# Patient Record
Sex: FEMALE | Race: WHITE | NOT HISPANIC OR LATINO
[De-identification: names, ages, dates, MRNs, and addresses within clinical notes are randomized per-mention and may not be internally consistent; named-entity substitution may affect disease eponyms.]

---

## 2017-01-29 ENCOUNTER — RESULT REVIEW (OUTPATIENT)
Age: 47
End: 2017-01-29

## 2018-03-29 ENCOUNTER — APPOINTMENT (OUTPATIENT)
Dept: INTERNAL MEDICINE | Facility: CLINIC | Age: 48
End: 2018-03-29
Payer: COMMERCIAL

## 2018-03-29 VITALS
HEIGHT: 62.5 IN | SYSTOLIC BLOOD PRESSURE: 120 MMHG | DIASTOLIC BLOOD PRESSURE: 80 MMHG | TEMPERATURE: 97.6 F | WEIGHT: 171 LBS | BODY MASS INDEX: 30.68 KG/M2

## 2018-03-29 DIAGNOSIS — M85.89 OTHER SPECIFIED DISORDERS OF BONE DENSITY AND STRUCTURE, MULTIPLE SITES: ICD-10-CM

## 2018-03-29 DIAGNOSIS — R10.13 EPIGASTRIC PAIN: ICD-10-CM

## 2018-03-29 DIAGNOSIS — R14.0 ABDOMINAL DISTENSION (GASEOUS): ICD-10-CM

## 2018-03-29 DIAGNOSIS — Z91.89 OTHER SPECIFIED PERSONAL RISK FACTORS, NOT ELSEWHERE CLASSIFIED: ICD-10-CM

## 2018-03-29 DIAGNOSIS — R92.2 INCONCLUSIVE MAMMOGRAM: ICD-10-CM

## 2018-03-29 PROCEDURE — 94010 BREATHING CAPACITY TEST: CPT

## 2018-03-29 PROCEDURE — 93000 ELECTROCARDIOGRAM COMPLETE: CPT

## 2018-03-29 PROCEDURE — 99386 PREV VISIT NEW AGE 40-64: CPT | Mod: 25

## 2018-04-21 ENCOUNTER — APPOINTMENT (OUTPATIENT)
Dept: GASTROENTEROLOGY | Facility: CLINIC | Age: 48
End: 2018-04-21
Payer: COMMERCIAL

## 2018-04-21 ENCOUNTER — LABORATORY RESULT (OUTPATIENT)
Age: 48
End: 2018-04-21

## 2018-04-21 PROCEDURE — 45380 COLONOSCOPY AND BIOPSY: CPT

## 2018-04-21 PROCEDURE — 43239 EGD BIOPSY SINGLE/MULTIPLE: CPT

## 2018-04-25 ENCOUNTER — OTHER (OUTPATIENT)
Age: 48
End: 2018-04-25

## 2018-05-01 ENCOUNTER — MEDICATION RENEWAL (OUTPATIENT)
Age: 48
End: 2018-05-01

## 2018-05-01 ENCOUNTER — OTHER (OUTPATIENT)
Age: 48
End: 2018-05-01

## 2018-05-01 ENCOUNTER — MESSAGE (OUTPATIENT)
Age: 48
End: 2018-05-01

## 2018-06-22 ENCOUNTER — APPOINTMENT (OUTPATIENT)
Dept: INTERNAL MEDICINE | Facility: CLINIC | Age: 48
End: 2018-06-22
Payer: COMMERCIAL

## 2018-06-22 VITALS
WEIGHT: 216 LBS | DIASTOLIC BLOOD PRESSURE: 70 MMHG | SYSTOLIC BLOOD PRESSURE: 118 MMHG | BODY MASS INDEX: 39.75 KG/M2 | TEMPERATURE: 96.9 F | HEIGHT: 62 IN

## 2018-06-22 DIAGNOSIS — Z23 ENCOUNTER FOR IMMUNIZATION: ICD-10-CM

## 2018-06-22 DIAGNOSIS — Z02.1 ENCOUNTER FOR PRE-EMPLOYMENT EXAMINATION: ICD-10-CM

## 2018-06-22 PROCEDURE — 86580 TB INTRADERMAL TEST: CPT

## 2018-06-22 PROCEDURE — 99213 OFFICE O/P EST LOW 20 MIN: CPT | Mod: 25

## 2018-06-25 ENCOUNTER — APPOINTMENT (OUTPATIENT)
Dept: INTERNAL MEDICINE | Facility: CLINIC | Age: 48
End: 2018-06-25
Payer: COMMERCIAL

## 2018-06-25 VITALS — SYSTOLIC BLOOD PRESSURE: 120 MMHG | DIASTOLIC BLOOD PRESSURE: 80 MMHG

## 2018-06-25 DIAGNOSIS — Z01.00 ENCOUNTER FOR EXAMINATION OF EYES AND VISION W/OUT ABNORMAL FINDINGS: ICD-10-CM

## 2018-06-25 PROCEDURE — 99173 VISUAL ACUITY SCREEN: CPT | Mod: 59

## 2018-06-25 PROCEDURE — 99213 OFFICE O/P EST LOW 20 MIN: CPT | Mod: 25

## 2018-08-20 DIAGNOSIS — R73.09 OTHER ABNORMAL GLUCOSE: ICD-10-CM

## 2018-08-20 DIAGNOSIS — E55.9 VITAMIN D DEFICIENCY, UNSPECIFIED: ICD-10-CM

## 2018-08-20 DIAGNOSIS — Z01.84 ENCOUNTER FOR ANTIBODY RESPONSE EXAMINATION: ICD-10-CM

## 2019-05-02 PROBLEM — E55.9 VITAMIN D DEFICIENCY: Status: ACTIVE | Noted: 2019-05-02

## 2019-05-02 PROBLEM — R73.09 ABNORMAL GLUCOSE: Status: ACTIVE | Noted: 2019-05-02

## 2019-05-02 PROBLEM — Z01.84 IMMUNITY STATUS TESTING: Status: ACTIVE | Noted: 2019-05-02

## 2019-05-03 ENCOUNTER — LABORATORY RESULT (OUTPATIENT)
Age: 49
End: 2019-05-03

## 2019-05-06 LAB
25(OH)D3 SERPL-MCNC: 30.8 NG/ML
ALBUMIN SERPL ELPH-MCNC: 4.5 G/DL
ALP BLD-CCNC: 54 U/L
ALT SERPL-CCNC: 14 U/L
ANION GAP SERPL CALC-SCNC: 10 MMOL/L
APPEARANCE: CLEAR
AST SERPL-CCNC: 17 U/L
BASOPHILS # BLD AUTO: 0.05 K/UL
BASOPHILS NFR BLD AUTO: 0.8 %
BILIRUB SERPL-MCNC: 0.4 MG/DL
BILIRUBIN URINE: NEGATIVE
BLOOD URINE: NEGATIVE
BUN SERPL-MCNC: 13 MG/DL
CALCIUM SERPL-MCNC: 9.6 MG/DL
CHLORIDE SERPL-SCNC: 106 MMOL/L
CHOLEST SERPL-MCNC: 211 MG/DL
CHOLEST/HDLC SERPL: 3.6 RATIO
CO2 SERPL-SCNC: 28 MMOL/L
COLOR: NORMAL
CREAT SERPL-MCNC: 0.8 MG/DL
EOSINOPHIL # BLD AUTO: 0.1 K/UL
EOSINOPHIL NFR BLD AUTO: 1.6 %
ERYTHROCYTE [SEDIMENTATION RATE] IN BLOOD BY WESTERGREN METHOD: 3 MM/HR
ESTIMATED AVERAGE GLUCOSE: 111 MG/DL
GLUCOSE QUALITATIVE U: NEGATIVE
GLUCOSE SERPL-MCNC: 89 MG/DL
HBA1C MFR BLD HPLC: 5.5 %
HBV SURFACE AB SERPL IA-ACNC: <3 MIU/ML
HCT VFR BLD CALC: 43.3 %
HDLC SERPL-MCNC: 59 MG/DL
HGB BLD-MCNC: 13.9 G/DL
IMM GRANULOCYTES NFR BLD AUTO: 0.3 %
KETONES URINE: NEGATIVE
LDLC SERPL CALC-MCNC: 138 MG/DL
LDLC SERPL DIRECT ASSAY-MCNC: 149 MG/DL
LEUKOCYTE ESTERASE URINE: NEGATIVE
LYMPHOCYTES # BLD AUTO: 2 K/UL
LYMPHOCYTES NFR BLD AUTO: 32.9 %
MAN DIFF?: NORMAL
MCHC RBC-ENTMCNC: 28.9 PG
MCHC RBC-ENTMCNC: 32.1 GM/DL
MCV RBC AUTO: 90 FL
MEV IGG FLD QL IA: 296 AU/ML
MEV IGG+IGM SER-IMP: POSITIVE
MONOCYTES # BLD AUTO: 0.56 K/UL
MONOCYTES NFR BLD AUTO: 9.2 %
MUV AB SER-ACNC: NORMAL
MUV IGG SER QL IA: 10.5 AU/ML
NEUTROPHILS # BLD AUTO: 3.34 K/UL
NEUTROPHILS NFR BLD AUTO: 55.2 %
NITRITE URINE: NEGATIVE
PH URINE: 7.5
PLATELET # BLD AUTO: 204 K/UL
POTASSIUM SERPL-SCNC: 4.3 MMOL/L
PROT SERPL-MCNC: 6.6 G/DL
PROTEIN URINE: NEGATIVE
RBC # BLD: 4.81 M/UL
RBC # FLD: 12.5 %
RUBV IGG FLD-ACNC: 27.1 INDEX
RUBV IGG SER-IMP: POSITIVE
SAVE SPECIMEN: NORMAL
SODIUM SERPL-SCNC: 143 MMOL/L
SPECIFIC GRAVITY URINE: 1.02
T3 SERPL-MCNC: 104 NG/DL
T3FREE SERPL-MCNC: 2.99 PG/ML
T3RU NFR SERPL: 1 TBI
T4 FREE SERPL-MCNC: 1.1 NG/DL
T4 SERPL-MCNC: 6 UG/DL
TRIGL SERPL-MCNC: 69 MG/DL
TSH SERPL-ACNC: 2.02 UIU/ML
UROBILINOGEN URINE: NORMAL
VZV AB TITR SER: POSITIVE
VZV IGG SER IF-ACNC: 858.1 INDEX
WBC # FLD AUTO: 6.07 K/UL

## 2019-05-15 ENCOUNTER — APPOINTMENT (OUTPATIENT)
Dept: INTERNAL MEDICINE | Facility: CLINIC | Age: 49
End: 2019-05-15
Payer: COMMERCIAL

## 2019-05-15 VITALS
HEIGHT: 62 IN | BODY MASS INDEX: 27.97 KG/M2 | WEIGHT: 152 LBS | DIASTOLIC BLOOD PRESSURE: 70 MMHG | TEMPERATURE: 97.6 F | SYSTOLIC BLOOD PRESSURE: 100 MMHG

## 2019-05-15 DIAGNOSIS — Z23 ENCOUNTER FOR IMMUNIZATION: ICD-10-CM

## 2019-05-15 PROCEDURE — 94010 BREATHING CAPACITY TEST: CPT

## 2019-05-15 PROCEDURE — 90471 IMMUNIZATION ADMIN: CPT

## 2019-05-15 PROCEDURE — 93000 ELECTROCARDIOGRAM COMPLETE: CPT

## 2019-05-15 PROCEDURE — 86580 TB INTRADERMAL TEST: CPT

## 2019-05-15 PROCEDURE — 90707 MMR VACCINE SC: CPT

## 2019-05-15 PROCEDURE — 99396 PREV VISIT EST AGE 40-64: CPT | Mod: 25

## 2019-05-15 PROCEDURE — 90472 IMMUNIZATION ADMIN EACH ADD: CPT

## 2019-05-15 PROCEDURE — 90750 HZV VACC RECOMBINANT IM: CPT

## 2019-05-15 NOTE — PHYSICAL EXAM
[No Acute Distress] : no acute distress [Well Nourished] : well nourished [Well Developed] : well developed [Normal Sclera/Conjunctiva] : normal sclera/conjunctiva [Well-Appearing] : well-appearing [PERRL] : pupils equal round and reactive to light [EOMI] : extraocular movements intact [Normal Outer Ear/Nose] : the outer ears and nose were normal in appearance [Normal Oropharynx] : the oropharynx was normal [No JVD] : no jugular venous distention [Supple] : supple [Thyroid Normal, No Nodules] : the thyroid was normal and there were no nodules present [No Lymphadenopathy] : no lymphadenopathy [No Respiratory Distress] : no respiratory distress  [Clear to Auscultation] : lungs were clear to auscultation bilaterally [Normal Rate] : normal rate  [No Accessory Muscle Use] : no accessory muscle use [Regular Rhythm] : with a regular rhythm [No Murmur] : no murmur heard [Normal S1, S2] : normal S1 and S2 [No Carotid Bruits] : no carotid bruits [No Abdominal Bruit] : a ~M bruit was not heard ~T in the abdomen [No Varicosities] : no varicosities [Pedal Pulses Present] : the pedal pulses are present [No Edema] : there was no peripheral edema [No Extremity Clubbing/Cyanosis] : no extremity clubbing/cyanosis [No Palpable Aorta] : no palpable aorta [Non Tender] : non-tender [Non-distended] : non-distended [Soft] : abdomen soft [No HSM] : no HSM [Normal Bowel Sounds] : normal bowel sounds [Normal Posterior Cervical Nodes] : no posterior cervical lymphadenopathy [Normal Anterior Cervical Nodes] : no anterior cervical lymphadenopathy [No CVA Tenderness] : no CVA  tenderness [No Joint Swelling] : no joint swelling [No Spinal Tenderness] : no spinal tenderness [Grossly Normal Strength/Tone] : grossly normal strength/tone [No Rash] : no rash [Normal Gait] : normal gait [Coordination Grossly Intact] : coordination grossly intact [No Focal Deficits] : no focal deficits [Normal Insight/Judgement] : insight and judgment were intact [Normal Affect] : the affect was normal [Deep Tendon Reflexes (DTR)] : deep tendon reflexes were 2+ and symmetric [Normal Appearance] : normal in appearance [No Masses] : no palpable masses [No Axillary Lymphadenopathy] : no axillary lymphadenopathy [de-identified] : dense fibrocystic breasts

## 2019-05-15 NOTE — HISTORY OF PRESENT ILLNESS
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  Her mumps IGG level was equivocal-needs an MMR booster shot.\par \par She is also interested in the Shingrix vaccine.\par \par Patient has been in good overall health this past year.\par She has been working out with a  since last year.  Her weight is down significantly since last year. [FreeTextEntry1] : Patient presents today for CPE.\par

## 2019-05-15 NOTE — REVIEW OF SYSTEMS
[Negative] : Psychiatric [Recent Change In Weight] : ~T recent weight change [FreeTextEntry2] : lost more than 50 lbs since last year

## 2019-05-15 NOTE — HEALTH RISK ASSESSMENT
[Patient reported mammogram was normal] : Patient reported mammogram was normal [Patient reported PAP Smear was normal] : Patient reported PAP Smear was normal [Patient reported bone density results were abnormal] : Patient reported bone density results were abnormal [Patient reported colonoscopy was normal] : Patient reported colonoscopy was normal [Good] : ~his/her~  mood as  good [MammogramComments] : at [facility] [PapSmearDate] : 05/18 [MammogramDate] : 05/18 [BoneDensityDate] : 05/18 [PapSmearComments] : with Dr. Morales [BoneDensityComments] : T scores were -1.7 and -0.5 [ColonoscopyDate] : 04/18 [ColonoscopyComments] : with Dr. Quintanilla

## 2019-08-01 ENCOUNTER — APPOINTMENT (OUTPATIENT)
Dept: INTERNAL MEDICINE | Facility: CLINIC | Age: 49
End: 2019-08-01

## 2019-09-05 ENCOUNTER — APPOINTMENT (OUTPATIENT)
Dept: INTERNAL MEDICINE | Facility: CLINIC | Age: 49
End: 2019-09-05

## 2019-11-20 ENCOUNTER — RESULT REVIEW (OUTPATIENT)
Age: 49
End: 2019-11-20

## 2020-01-06 ENCOUNTER — EMERGENCY (EMERGENCY)
Facility: HOSPITAL | Age: 50
LOS: 1 days | Discharge: HOME | End: 2020-01-06
Attending: EMERGENCY MEDICINE | Admitting: EMERGENCY MEDICINE
Payer: COMMERCIAL

## 2020-01-06 ENCOUNTER — TRANSCRIPTION ENCOUNTER (OUTPATIENT)
Age: 50
End: 2020-01-06

## 2020-01-06 VITALS
HEART RATE: 63 BPM | OXYGEN SATURATION: 98 % | TEMPERATURE: 98 F | RESPIRATION RATE: 18 BRPM | DIASTOLIC BLOOD PRESSURE: 85 MMHG | WEIGHT: 160.06 LBS | SYSTOLIC BLOOD PRESSURE: 148 MMHG

## 2020-01-06 LAB
ALBUMIN SERPL ELPH-MCNC: 3.9 G/DL — SIGNIFICANT CHANGE UP (ref 3.3–5)
ALP SERPL-CCNC: 58 U/L — SIGNIFICANT CHANGE UP (ref 40–120)
ALT FLD-CCNC: 28 U/L — SIGNIFICANT CHANGE UP (ref 12–78)
ANION GAP SERPL CALC-SCNC: 7 MMOL/L — SIGNIFICANT CHANGE UP (ref 5–17)
AST SERPL-CCNC: 18 U/L — SIGNIFICANT CHANGE UP (ref 15–37)
BASOPHILS # BLD AUTO: 0.06 K/UL — SIGNIFICANT CHANGE UP (ref 0–0.2)
BASOPHILS NFR BLD AUTO: 0.8 % — SIGNIFICANT CHANGE UP (ref 0–2)
BILIRUB SERPL-MCNC: 0.3 MG/DL — SIGNIFICANT CHANGE UP (ref 0.2–1.2)
BUN SERPL-MCNC: 17 MG/DL — SIGNIFICANT CHANGE UP (ref 7–23)
CALCIUM SERPL-MCNC: 9 MG/DL — SIGNIFICANT CHANGE UP (ref 8.5–10.1)
CHLORIDE SERPL-SCNC: 109 MMOL/L — HIGH (ref 96–108)
CO2 SERPL-SCNC: 25 MMOL/L — SIGNIFICANT CHANGE UP (ref 22–31)
CREAT SERPL-MCNC: 0.74 MG/DL — SIGNIFICANT CHANGE UP (ref 0.5–1.3)
EOSINOPHIL # BLD AUTO: 0.11 K/UL — SIGNIFICANT CHANGE UP (ref 0–0.5)
EOSINOPHIL NFR BLD AUTO: 1.4 % — SIGNIFICANT CHANGE UP (ref 0–6)
GLUCOSE SERPL-MCNC: 99 MG/DL — SIGNIFICANT CHANGE UP (ref 70–99)
HCG SERPL-ACNC: <1 MIU/ML — SIGNIFICANT CHANGE UP
HCT VFR BLD CALC: 37.7 % — SIGNIFICANT CHANGE UP (ref 34.5–45)
HGB BLD-MCNC: 13.2 G/DL — SIGNIFICANT CHANGE UP (ref 11.5–15.5)
IMM GRANULOCYTES NFR BLD AUTO: 0.1 % — SIGNIFICANT CHANGE UP (ref 0–1.5)
LYMPHOCYTES # BLD AUTO: 2.26 K/UL — SIGNIFICANT CHANGE UP (ref 1–3.3)
LYMPHOCYTES # BLD AUTO: 28.6 % — SIGNIFICANT CHANGE UP (ref 13–44)
MCHC RBC-ENTMCNC: 29.3 PG — SIGNIFICANT CHANGE UP (ref 27–34)
MCHC RBC-ENTMCNC: 35 GM/DL — SIGNIFICANT CHANGE UP (ref 32–36)
MCV RBC AUTO: 83.8 FL — SIGNIFICANT CHANGE UP (ref 80–100)
MONOCYTES # BLD AUTO: 0.66 K/UL — SIGNIFICANT CHANGE UP (ref 0–0.9)
MONOCYTES NFR BLD AUTO: 8.4 % — SIGNIFICANT CHANGE UP (ref 2–14)
NEUTROPHILS # BLD AUTO: 4.79 K/UL — SIGNIFICANT CHANGE UP (ref 1.8–7.4)
NEUTROPHILS NFR BLD AUTO: 60.7 % — SIGNIFICANT CHANGE UP (ref 43–77)
NRBC # BLD: 0 /100 WBCS — SIGNIFICANT CHANGE UP (ref 0–0)
PLATELET # BLD AUTO: 217 K/UL — SIGNIFICANT CHANGE UP (ref 150–400)
POTASSIUM SERPL-MCNC: 4 MMOL/L — SIGNIFICANT CHANGE UP (ref 3.5–5.3)
POTASSIUM SERPL-SCNC: 4 MMOL/L — SIGNIFICANT CHANGE UP (ref 3.5–5.3)
PROT SERPL-MCNC: 7.1 G/DL — SIGNIFICANT CHANGE UP (ref 6–8.3)
RBC # BLD: 4.5 M/UL — SIGNIFICANT CHANGE UP (ref 3.8–5.2)
RBC # FLD: 12.6 % — SIGNIFICANT CHANGE UP (ref 10.3–14.5)
SODIUM SERPL-SCNC: 141 MMOL/L — SIGNIFICANT CHANGE UP (ref 135–145)
WBC # BLD: 7.89 K/UL — SIGNIFICANT CHANGE UP (ref 3.8–10.5)
WBC # FLD AUTO: 7.89 K/UL — SIGNIFICANT CHANGE UP (ref 3.8–10.5)

## 2020-01-06 PROCEDURE — 93010 ELECTROCARDIOGRAM REPORT: CPT

## 2020-01-06 PROCEDURE — 76705 ECHO EXAM OF ABDOMEN: CPT | Mod: 26

## 2020-01-06 PROCEDURE — 83690 ASSAY OF LIPASE: CPT

## 2020-01-06 PROCEDURE — 99284 EMERGENCY DEPT VISIT MOD MDM: CPT

## 2020-01-06 PROCEDURE — 96374 THER/PROPH/DIAG INJ IV PUSH: CPT

## 2020-01-06 PROCEDURE — 80053 COMPREHEN METABOLIC PANEL: CPT

## 2020-01-06 PROCEDURE — 93005 ELECTROCARDIOGRAM TRACING: CPT

## 2020-01-06 PROCEDURE — 36415 COLL VENOUS BLD VENIPUNCTURE: CPT

## 2020-01-06 PROCEDURE — 85027 COMPLETE CBC AUTOMATED: CPT

## 2020-01-06 PROCEDURE — 84702 CHORIONIC GONADOTROPIN TEST: CPT

## 2020-01-06 PROCEDURE — 99284 EMERGENCY DEPT VISIT MOD MDM: CPT | Mod: 25

## 2020-01-06 PROCEDURE — 96375 TX/PRO/DX INJ NEW DRUG ADDON: CPT

## 2020-01-06 PROCEDURE — 84484 ASSAY OF TROPONIN QUANT: CPT

## 2020-01-06 PROCEDURE — 76705 ECHO EXAM OF ABDOMEN: CPT

## 2020-01-06 RX ORDER — SUCRALFATE 1 G
1 TABLET ORAL
Qty: 28 | Refills: 0
Start: 2020-01-06 | End: 2020-01-12

## 2020-01-06 RX ORDER — OXYCODONE AND ACETAMINOPHEN 5; 325 MG/1; MG/1
1 TABLET ORAL ONCE
Refills: 0 | Status: DISCONTINUED | OUTPATIENT
Start: 2020-01-06 | End: 2020-01-06

## 2020-01-06 RX ORDER — KETOROLAC TROMETHAMINE 30 MG/ML
15 SYRINGE (ML) INJECTION ONCE
Refills: 0 | Status: DISCONTINUED | OUTPATIENT
Start: 2020-01-06 | End: 2020-01-06

## 2020-01-06 RX ORDER — SODIUM CHLORIDE 9 MG/ML
1000 INJECTION INTRAMUSCULAR; INTRAVENOUS; SUBCUTANEOUS ONCE
Refills: 0 | Status: DISCONTINUED | OUTPATIENT
Start: 2020-01-06 | End: 2020-01-06

## 2020-01-06 RX ORDER — SUCRALFATE 1 G
1 TABLET ORAL ONCE
Refills: 0 | Status: COMPLETED | OUTPATIENT
Start: 2020-01-06 | End: 2020-01-06

## 2020-01-06 RX ORDER — PANTOPRAZOLE SODIUM 20 MG/1
40 TABLET, DELAYED RELEASE ORAL ONCE
Refills: 0 | Status: COMPLETED | OUTPATIENT
Start: 2020-01-06 | End: 2020-01-06

## 2020-01-06 RX ORDER — IOHEXOL 300 MG/ML
30 INJECTION, SOLUTION INTRAVENOUS ONCE
Refills: 0 | Status: DISCONTINUED | OUTPATIENT
Start: 2020-01-06 | End: 2020-01-06

## 2020-01-06 RX ORDER — FAMOTIDINE 10 MG/ML
20 INJECTION INTRAVENOUS ONCE
Refills: 0 | Status: COMPLETED | OUTPATIENT
Start: 2020-01-06 | End: 2020-01-06

## 2020-01-06 RX ORDER — PANTOPRAZOLE SODIUM 20 MG/1
1 TABLET, DELAYED RELEASE ORAL
Qty: 30 | Refills: 0
Start: 2020-01-06 | End: 2020-02-04

## 2020-01-06 RX ORDER — DIPHENHYDRAMINE HYDROCHLORIDE AND LIDOCAINE HYDROCHLORIDE AND ALUMINUM HYDROXIDE AND MAGNESIUM HYDRO
30 KIT ONCE
Refills: 0 | Status: COMPLETED | OUTPATIENT
Start: 2020-01-06 | End: 2020-01-06

## 2020-01-06 RX ORDER — SODIUM CHLORIDE 9 MG/ML
1000 INJECTION INTRAMUSCULAR; INTRAVENOUS; SUBCUTANEOUS ONCE
Refills: 0 | Status: COMPLETED | OUTPATIENT
Start: 2020-01-06 | End: 2020-01-06

## 2020-01-06 RX ORDER — ONDANSETRON 8 MG/1
4 TABLET, FILM COATED ORAL ONCE
Refills: 0 | Status: COMPLETED | OUTPATIENT
Start: 2020-01-06 | End: 2020-01-06

## 2020-01-06 RX ADMIN — SODIUM CHLORIDE 1000 MILLILITER(S): 9 INJECTION INTRAMUSCULAR; INTRAVENOUS; SUBCUTANEOUS at 20:03

## 2020-01-06 RX ADMIN — DIPHENHYDRAMINE HYDROCHLORIDE AND LIDOCAINE HYDROCHLORIDE AND ALUMINUM HYDROXIDE AND MAGNESIUM HYDRO 30 MILLILITER(S): KIT at 19:50

## 2020-01-06 RX ADMIN — PANTOPRAZOLE SODIUM 40 MILLIGRAM(S): 20 TABLET, DELAYED RELEASE ORAL at 19:49

## 2020-01-06 RX ADMIN — Medication 15 MILLIGRAM(S): at 19:49

## 2020-01-06 RX ADMIN — OXYCODONE AND ACETAMINOPHEN 1 TABLET(S): 5; 325 TABLET ORAL at 20:54

## 2020-01-06 RX ADMIN — FAMOTIDINE 20 MILLIGRAM(S): 10 INJECTION INTRAVENOUS at 19:49

## 2020-01-06 RX ADMIN — ONDANSETRON 4 MILLIGRAM(S): 8 TABLET, FILM COATED ORAL at 19:49

## 2020-01-06 RX ADMIN — Medication 1 GRAM(S): at 20:54

## 2020-01-06 NOTE — ED ADULT NURSE NOTE - OBJECTIVE STATEMENT
Present to Er with c/o of abdominal pain. Pt was sent from Urgent care to rule out pancreatitis. Denies any chest pain or shortness of breath.

## 2020-01-06 NOTE — ED PROVIDER NOTE - RESPIRATORY, MLM
How Severe Are Your Bumps?: mild Have Your Bumps Been Treated?: not been treated Is This A New Presentation, Or A Follow-Up?: Bump Breath sounds clear and equal bilaterally.

## 2020-01-06 NOTE — ED PROVIDER NOTE - NSFOLLOWUPINSTRUCTIONS_ED_ALL_ED_FT
Gastritis, Adult  Gastritis is inflammation of the stomach. There are two kinds of gastritis:  Acute gastritis. This kind develops suddenly.Chronic gastritis. This kind is much more common and lasts for a long time.Gastritis happens when the lining of the stomach becomes weak or gets damaged. Without treatment, gastritis can lead to stomach bleeding and ulcers.  What are the causes?  This condition may be caused by:  An infection.Drinking too much alcohol.Certain medicines. These include steroids, antibiotics, and some over-the-counter medicines, such as aspirin or ibuprofen.Having too much acid in the stomach.A disease of the intestines or stomach.Stress.An allergic reaction.Crohn's disease.Some cancer treatments (radiation).Sometimes the cause of this condition is not known.  What are the signs or symptoms?  Symptoms of this condition include:  Pain or a burning sensation in the upper abdomen.Nausea.Vomiting.An uncomfortable feeling of fullness after eating.Weight loss.Bad breath.Blood in your vomit or stools.In some cases, there are no symptoms.  How is this diagnosed?  This condition may be diagnosed with:  Your medical history and a description of your symptoms.A physical exam.Tests. These can include:  Blood tests.Stool tests.A test in which a thin, flexible instrument with a light and a camera is passed down the esophagus and into the stomach (upper endoscopy).A test in which a sample of tissue is taken for testing (biopsy).How is this treated?  This condition may be treated with medicines. The medicines that are used vary depending on the cause of the gastritis:  If the condition is caused by a bacterial infection, you may be given antibiotic medicines.If the condition is caused by too much acid in the stomach, you may be given medicines called H2 blockers, proton pump inhibitors, or antacids.Treatment may also involve stopping the use of certain medicines, such as aspirin, ibuprofen, or other NSAIDs.  Follow these instructions at home:  Medicines     Take over-the-counter and prescription medicines only as told by your health care provider.If you were prescribed an antibiotic medicine, take it as told by your health care provider. Do not stop taking the antibiotic even if you start to feel better.Eating and drinking        Eat small, frequent meals instead of large meals.Avoid foods and drinks that make your symptoms worse.Drink enough fluid to keep your urine pale yellow.Alcohol use     Do not drink alcohol if:  Your health care provider tells you not to drink.You are pregnant, may be pregnant, or are planning to become pregnant.If you drink alcohol:  Limit your use to:  0–1 drink a day for women.0–2 drinks a day for men.Be aware of how much alcohol is in your drink. In the [place] one drink equals one 12 oz bottle of beer (355 mL), one 5 oz glass of wine (148 mL), or one 1½ oz glass of hard liquor (44 mL).General instructions     Talk with your health care provider about ways to manage stress, such as getting regular exercise or practicing deep breathing, meditation, or yoga.Do not use any products that contain nicotine or tobacco, such as cigarettes and e-cigarettes. If you need help quitting, ask your health care provider.Keep all follow-up visits as told by your health care provider. This is important.Contact a health care provider if:  Your symptoms get worse.Your symptoms return after treatment.Get help right away if:  You vomit blood or material that looks like coffee grounds.You have black or dark red stools.You are unable to keep fluids down.Your abdominal pain gets worse.You have a fever.You do not feel better after one week.Summary  Gastritis is inflammation of the lining of the stomach that can occur suddenly (acute) or develop slowly over time (chronic).This condition is diagnosed with a medical history, a physical exam, or tests.This condition may be treated with medicines to treat infection or medicines to reduce the amount of acid in your stomach.Follow your health care provider's instructions about taking medicines, making changes to your diet, and knowing when to call for help.This information is not intended to replace advice given to you by your health care provider. Make sure you discuss any questions you have with your health care provider.    Document Released: 12/12/2002 Document Revised: 05/07/2019 Document Reviewed: 05/07/2019  [email or web address] Interactive Patient Education © 2019 [employer]

## 2020-01-06 NOTE — ED PROVIDER NOTE - CPE EDP EYES NORM
Spiritual Plan of Care    Pt Name: Gokul Ribeiro  Pt : 1960  Date: 2018    Referral source: Trigger    Reason for visit: Emotional Support, Spiritual/Evangelical/Ritual Support    Visited with: Patient    Patient Assessment: Unable to assess    Trigger. Pt sleeping. A  will attempt to visit pt another time.   normal...

## 2020-01-06 NOTE — ED PROVIDER NOTE - PATIENT PORTAL LINK FT
You can access the FollowMyHealth Patient Portal offered by [facility] by registering at the following website: [email or web address]. By joining [employer]’s FollowMyHealth portal, you will also be able to view your health information using other applications (apps) compatible with our system.

## 2020-01-06 NOTE — ED PROVIDER NOTE - NS ED ATTENDING STATEMENT MOD
No I have personally performed a face to face diagnostic evaluation on this patient. I have reviewed the ACP note and agree with the history, exam and plan of care, except as noted. Attending Only

## 2020-01-06 NOTE — ED PROVIDER NOTE - ATTENDING CONTRIBUTION TO CARE
I have personally performed a face to face diagnostic evaluation on this patient.  I have reviewed the PA note and agree with the history, exam, and plan of care, except as noted.  History and Exam by me shows patient sent to ER from urgent care for evalaution of epigastric pain, patient states earlier today she took motrin for muscleskeltal pain, later in afternoon felt spasming of epigastric area, radiating to sides, no fever, no vomiting, patient took pepcid and maalox with no releif, patient alert and oriented, heart and lungs clear, abdomen soft, epigastric discomfort with palpation, f/u labs, US abdomen, pain control, re-eval.

## 2020-01-06 NOTE — ED PROVIDER NOTE - CARE PROVIDER_API CALL
Mansi Quintanilla)  Gastroenterology  [address] [place] [address], [address]  [place] [address]  Phone: [phone]  Fax: [phone]  Follow Up Time:

## 2020-01-08 ENCOUNTER — APPOINTMENT (OUTPATIENT)
Dept: GASTROENTEROLOGY | Facility: CLINIC | Age: 50
End: 2020-01-08
Payer: COMMERCIAL

## 2020-01-08 VITALS
WEIGHT: 152 LBS | BODY MASS INDEX: 27.97 KG/M2 | TEMPERATURE: 97.9 F | SYSTOLIC BLOOD PRESSURE: 122 MMHG | HEIGHT: 62 IN | DIASTOLIC BLOOD PRESSURE: 90 MMHG

## 2020-01-08 DIAGNOSIS — R11.0 NAUSEA: ICD-10-CM

## 2020-01-08 DIAGNOSIS — R10.9 UNSPECIFIED ABDOMINAL PAIN: ICD-10-CM

## 2020-01-08 PROCEDURE — 99214 OFFICE O/P EST MOD 30 MIN: CPT

## 2020-01-08 NOTE — HISTORY OF PRESENT ILLNESS
[FreeTextEntry1] : 50 yo female with c/o epigastric pain 2 days ago, too pepcid started in Am.  Patient tried mylanta but had squeezing  abdominal pain. normal us. normal  bms. patient has been eating bland food.has had GERD..\par some nausea, no vomiting. normal bms with stool softner. patient reports abdominal pain 3/10 pain scale.

## 2020-01-08 NOTE — REVIEW OF SYSTEMS
[As Noted in HPI] : as noted in HPI [Fever] : no fever [Chills] : no chills [Chest Pain] : no chest pain [Earache] : no earache [Red Eyes] : eyes not red [SOB on Exertion] : no shortness of breath during exertion [Cough] : no cough [Arthralgias] : no arthralgias [Skin Lesions] : no skin lesions [Joint Pain] : no joint pain [Dizziness] : no dizziness [Anxiety] : no anxiety [Depression] : no depression [Easy Bleeding] : no tendency for easy bleeding [Muscle Weakness] : no muscle weakness [Easy Bruising] : no tendency for easy bruising

## 2020-01-08 NOTE — ASSESSMENT
[FreeTextEntry1] : Severe epigastric pain requring er evaluation, record  reviewed normal abdominal us, normal labs. Probable severe gerd.  s/p egd/colonoscopy in 2018 .\par \par plan ppi daily in am\par          h2 blocker qhs\par          carafate as needed.\par           bland diet\par           zofran prn\par          recommend continue ppi for at least 3 months and then to taper off slowly

## 2020-01-08 NOTE — PHYSICAL EXAM
[General Appearance - Alert] : alert [General Appearance - In No Acute Distress] : in no acute distress [General Appearance - Well Nourished] : well nourished [Sclera] : the sclera and conjunctiva were normal [Auscultation Breath Sounds / Voice Sounds] : lungs were clear to auscultation bilaterally [Respiration, Rhythm And Depth] : normal respiratory rhythm and effort [No CVA Tenderness] : no ~M costovertebral angle tenderness [Heart Sounds] : normal S1 and S2 [Nail Clubbing] : no clubbing  or cyanosis of the fingernails [Oriented To Time, Place, And Person] : oriented to person, place, and time [Heart Rate And Rhythm] : heart rate was normal and rhythm regular [] : no respiratory distress [Abdomen Soft] : soft [Bowel Sounds] : normal bowel sounds [Abdomen Tenderness] : non-tender [Abnormal Walk] : normal gait [Skin Color & Pigmentation] : normal skin color and pigmentation [Skin Lesions] : no skin lesions [No Focal Deficits] : no focal deficits

## 2020-10-21 ENCOUNTER — APPOINTMENT (OUTPATIENT)
Dept: INTERNAL MEDICINE | Facility: CLINIC | Age: 50
End: 2020-10-21
Payer: COMMERCIAL

## 2020-10-21 VITALS
WEIGHT: 160 LBS | SYSTOLIC BLOOD PRESSURE: 122 MMHG | TEMPERATURE: 98.2 F | HEART RATE: 73 BPM | HEIGHT: 62 IN | DIASTOLIC BLOOD PRESSURE: 60 MMHG | OXYGEN SATURATION: 98 % | BODY MASS INDEX: 29.44 KG/M2

## 2020-10-21 DIAGNOSIS — Z11.1 ENCOUNTER FOR SCREENING FOR RESPIRATORY TUBERCULOSIS: ICD-10-CM

## 2020-10-21 PROCEDURE — 99072 ADDL SUPL MATRL&STAF TM PHE: CPT

## 2020-10-21 PROCEDURE — 86580 TB INTRADERMAL TEST: CPT

## 2020-10-21 PROCEDURE — 99212 OFFICE O/P EST SF 10 MIN: CPT | Mod: 25

## 2020-10-22 ENCOUNTER — MED ADMIN CHARGE (OUTPATIENT)
Age: 50
End: 2020-10-22

## 2020-11-11 ENCOUNTER — APPOINTMENT (OUTPATIENT)
Dept: ORTHOPEDIC SURGERY | Facility: CLINIC | Age: 50
End: 2020-11-11
Payer: COMMERCIAL

## 2020-11-11 VITALS — HEIGHT: 62 IN | WEIGHT: 160 LBS | BODY MASS INDEX: 29.44 KG/M2

## 2020-11-11 DIAGNOSIS — S76.302A UNSPECIFIED INJURY OF MUSCLE, FASCIA AND TENDON OF THE POSTERIOR MUSCLE GROUP AT THIGH LEVEL, LEFT THIGH, INITIAL ENCOUNTER: ICD-10-CM

## 2020-11-11 DIAGNOSIS — M75.41 IMPINGEMENT SYNDROME OF RIGHT SHOULDER: ICD-10-CM

## 2020-11-11 PROCEDURE — 72170 X-RAY EXAM OF PELVIS: CPT

## 2020-11-11 PROCEDURE — 73030 X-RAY EXAM OF SHOULDER: CPT | Mod: RT

## 2020-11-11 PROCEDURE — 99072 ADDL SUPL MATRL&STAF TM PHE: CPT

## 2020-11-11 PROCEDURE — 99203 OFFICE O/P NEW LOW 30 MIN: CPT | Mod: 25

## 2020-11-11 PROCEDURE — 20610 DRAIN/INJ JOINT/BURSA W/O US: CPT | Mod: RT

## 2020-11-11 NOTE — PHYSICAL EXAM
[de-identified] : General Exam\par \par Well developed, well nourished\par No apparent distress\par Oriented to person, place, and time\par Mood: Normal\par Affect: Normal\par Balance and coordination: Normal\par Gait: Normal\par \par Left hip exam\par \par Skin: Clean/dry and intact\par Inspection: No obvious deformity, no swelling, no ecchymosis.\par Tenderness: no tenderness over greater trochanter/glut medius insertion. No tenderness pubic symphysis, pubic tubercle, hip flexors. + ttp ischial tuberosity and buttock. No ttp over the ASIS/Illiac crest.\par ROM: 0-120°. Internal rotation 30 external rotation 70\par Painful ROM: None\par hamstring + ttp biceps femoris.  + pauin w resisted hip ext\par Additional tests: No pain with circumduction negative impingement test at 90° mildly positive impingement test at 60° negative Jake negative [facility]\par Strength: 5/5 hip flexion/ADD/ABD/Q/H/TA/GS/EHL\par Neuro: Sensation in tact to light touch throughout in dp/sp/tib/elzbieta/saph distributions\par Pulses: 2+ DP/PT pulses\par \par Right shoulder exam\par \par Inspection: No swelling, ecchymosis or gross deformity.\par Skin: No masses, No lesions\par Neck: Negative Spurlings, full ROM without pain\par Tenderness: + bicipital tenderness, no tenderness to the greater tuberosity/RTC insertion, no anterior shoulder/lesser tuberosity tenderness. No tenderness SC joint, clavicle, AC joint.\par ROM: 160/60/T6\par Impingement tests: pos sotelo\par AC Joint: no pain with cross arm testing\par Biceps: ildly pos speed\par Strength: 5/5 abduction, external rotation, and internal rotation\par Neuro: AIN, PIN, Ulnar nerve motor intact\par Sensation: Intact to light touch in radial, median, ulnar, and axillary nerve distributions\par Vasc: 2+ radial pulse\par \par \par  [de-identified] : \par The following radiographs were ordered and read by me during this patients visit. I reviewed each radiograph in detail with the patient and discussed the findings as highlighted below. \par \par 3 views right shoulder were obtained today that show no fracture, dislocation. There is no degenerative change seen. There is no malalignment. No obvious osseous abnormality. Otherwise unremarkable.\par \par AP pelvis radiograph was obtained today. They're small calcific density at the lateral acetabulum bilaterally. Joint spaces are well maintained. Otherwise unremarkable\par \par

## 2020-11-11 NOTE — DISCUSSION/SUMMARY
[de-identified] : Right shoulder impingement and biceps tendinitis. Left hip hamstring tendinitis. She is a pain for greater than 8 months. We'll obtain an MRI of the left hip to further evaluate her hamstring. In terms of her shoulder she is biceps tendinitis and impingement\par \par Injection: Right shoulder (steps sheath\par Indication: Biceps tendinitis\par A discussion was had with the patient regarding this procedure and all questions were answered. All risks, benefits and alternatives were discussed. These included but were not limited to bleeding, infection, and allergic reaction. Alcohol was used to clean the skin, and betadine was used to sterilize and prep the area in the anterior  aspect of the right shoulder. Ethyl chloride spray was then used as a topical anesthetic. A 21-gauge needle was used to inject 4cc of 1% lidocaine and 1cc of 40mg/ml methylprednisolone into the right biceps sheath. A sterile bandage was then applied. The patient tolerated the procedure well and there were no complications. \par \par All questions answered follow up as needed

## 2020-11-11 NOTE — HISTORY OF PRESENT ILLNESS
[de-identified] : 50-year-old female with chief complaint of left hip and right shoulder pain. She reports pain for greater than 7 months. She increased her exercise during that had been as noted increasing pain in both regions. In terms of her head she has pain with sitting squats and lunges pain is located in the buttock posteriorly she feels it has been worsening. In terms of her shoulder she reports pain in the anterior shoulder that radiates into the biceps muscle she has pain with overhead activity she denies numbness and tingling\par \par The patient's past medical history, past surgical history, medications, allergies, and social history were reviewed by me today with the patient and documented accordingly. In addition, the patient's family history, which is noncontributory to this visit, was also reviewed.\par

## 2020-12-18 ENCOUNTER — RESULT REVIEW (OUTPATIENT)
Age: 50
End: 2020-12-18

## 2020-12-18 ENCOUNTER — APPOINTMENT (OUTPATIENT)
Dept: ULTRASOUND IMAGING | Facility: CLINIC | Age: 50
End: 2020-12-18
Payer: COMMERCIAL

## 2020-12-18 ENCOUNTER — OUTPATIENT (OUTPATIENT)
Dept: OUTPATIENT SERVICES | Facility: HOSPITAL | Age: 50
LOS: 1 days | End: 2020-12-18
Payer: COMMERCIAL

## 2020-12-18 DIAGNOSIS — S76.302A UNSPECIFIED INJURY OF MUSCLE, FASCIA AND TENDON OF THE POSTERIOR MUSCLE GROUP AT THIGH LEVEL, LEFT THIGH, INITIAL ENCOUNTER: ICD-10-CM

## 2020-12-18 PROCEDURE — 20611 DRAIN/INJ JOINT/BURSA W/US: CPT

## 2020-12-18 PROCEDURE — 20611 DRAIN/INJ JOINT/BURSA W/US: CPT | Mod: LT

## 2020-12-22 ENCOUNTER — RX RENEWAL (OUTPATIENT)
Age: 50
End: 2020-12-22

## 2020-12-23 PROBLEM — Z11.1 ENCOUNTER FOR PPD SKIN TEST READING: Status: RESOLVED | Noted: 2018-06-25 | Resolved: 2020-12-23

## 2020-12-28 ENCOUNTER — TRANSCRIPTION ENCOUNTER (OUTPATIENT)
Age: 50
End: 2020-12-28

## 2020-12-28 RX ORDER — ALBUTEROL SULFATE 90 UG/1
108 (90 BASE) INHALANT RESPIRATORY (INHALATION) EVERY 4 HOURS
Qty: 1 | Refills: 6 | Status: ACTIVE | COMMUNITY
Start: 2020-12-28 | End: 1900-01-01

## 2020-12-29 ENCOUNTER — TRANSCRIPTION ENCOUNTER (OUTPATIENT)
Age: 50
End: 2020-12-29

## 2020-12-30 ENCOUNTER — TRANSCRIPTION ENCOUNTER (OUTPATIENT)
Age: 50
End: 2020-12-30

## 2020-12-30 ENCOUNTER — APPOINTMENT (OUTPATIENT)
Dept: GASTROENTEROLOGY | Facility: CLINIC | Age: 50
End: 2020-12-30
Payer: COMMERCIAL

## 2020-12-30 DIAGNOSIS — R07.89 OTHER CHEST PAIN: ICD-10-CM

## 2020-12-30 PROCEDURE — 99214 OFFICE O/P EST MOD 30 MIN: CPT | Mod: 95

## 2020-12-30 NOTE — HISTORY OF PRESENT ILLNESS
[FreeTextEntry1] : 51 yo female with h/o gerd, esophageal spasms and abdominal pain, s/p egd/colonoscopy in 2018, pt currenty at home with covid, loss of taste , congestion, and generalized weaknes.. no fever. \par Patient with c/o sternal pain , started before she got covid, hurts mostly at night and in morning. Patient was also unable to do abdominal exercises due to pain. Pain hurts when she touches that area.\par patient reports  occassional gerd, takes antacid as needed, but does not seem to help this pain.\par no sob\par \par normal bms, no brbpr, no melena. no weight loss.

## 2020-12-30 NOTE — REVIEW OF SYSTEMS
[Fever] : no fever [Chills] : no chills [Feeling Poorly] : feeling poorly [Feeling Tired] : feeling tired [Loss Of Hearing] : no hearing loss [Chest Pain] : no chest pain [As Noted in HPI] : as noted in HPI [Dysuria] : no dysuria [Arthralgias] : no arthralgias [Skin Lesions] : no skin lesions [Anxiety] : no anxiety [Muscle Weakness] : muscle weakness [Easy Bleeding] : no tendency for easy bleeding [Easy Bruising] : no tendency for easy bruising [FreeTextEntry4] : loss of taste

## 2020-12-30 NOTE — PHYSICAL EXAM
[Sclera] : the sclera and conjunctiva were normal [Hearing Threshold Finger Rub Not [place]] : hearing was normal [Auscultation Breath Sounds / Voice Sounds] : lungs were clear to auscultation bilaterally [Bowel Sounds] : normal bowel sounds [Abdomen Soft] : soft [Abdomen Tenderness] : non-tender [Abnormal Walk] : normal gait

## 2020-12-30 NOTE — ASSESSMENT
[FreeTextEntry1] : sternal chest pain, probable  musculoskeletal pain\par \par plan trial of nsaid at night before  bed\par         monitor clinically\par        pt has appt with  in January

## 2020-12-30 NOTE — REASON FOR VISIT
[Home] : at home, [unfilled] , at the time of the visit. [Medical Office: ([facility])___] : at the medical office located in  [Verbal consent obtained from patient] : the patient, [unfilled]

## 2021-01-06 ENCOUNTER — OUTPATIENT (OUTPATIENT)
Dept: OUTPATIENT SERVICES | Facility: HOSPITAL | Age: 51
LOS: 1 days | End: 2021-01-06
Payer: COMMERCIAL

## 2021-01-06 ENCOUNTER — APPOINTMENT (OUTPATIENT)
Dept: RADIOLOGY | Facility: CLINIC | Age: 51
End: 2021-01-06
Payer: COMMERCIAL

## 2021-01-06 ENCOUNTER — LABORATORY RESULT (OUTPATIENT)
Age: 51
End: 2021-01-06

## 2021-01-06 ENCOUNTER — APPOINTMENT (OUTPATIENT)
Dept: INTERNAL MEDICINE | Facility: CLINIC | Age: 51
End: 2021-01-06
Payer: COMMERCIAL

## 2021-01-06 VITALS
SYSTOLIC BLOOD PRESSURE: 132 MMHG | DIASTOLIC BLOOD PRESSURE: 100 MMHG | BODY MASS INDEX: 27.23 KG/M2 | HEIGHT: 62 IN | WEIGHT: 148 LBS | TEMPERATURE: 97.8 F

## 2021-01-06 DIAGNOSIS — R00.2 PALPITATIONS: ICD-10-CM

## 2021-01-06 DIAGNOSIS — R07.89 OTHER CHEST PAIN: ICD-10-CM

## 2021-01-06 DIAGNOSIS — Z00.8 ENCOUNTER FOR OTHER GENERAL EXAMINATION: ICD-10-CM

## 2021-01-06 DIAGNOSIS — Z11.59 ENCOUNTER FOR SCREENING FOR OTHER VIRAL DISEASES: ICD-10-CM

## 2021-01-06 PROCEDURE — 71046 X-RAY EXAM CHEST 2 VIEWS: CPT

## 2021-01-06 PROCEDURE — 99396 PREV VISIT EST AGE 40-64: CPT | Mod: 25

## 2021-01-06 PROCEDURE — 99072 ADDL SUPL MATRL&STAF TM PHE: CPT

## 2021-01-06 PROCEDURE — 93000 ELECTROCARDIOGRAM COMPLETE: CPT

## 2021-01-06 PROCEDURE — 71046 X-RAY EXAM CHEST 2 VIEWS: CPT | Mod: 26

## 2021-01-06 PROCEDURE — 36415 COLL VENOUS BLD VENIPUNCTURE: CPT

## 2021-01-07 LAB
25(OH)D3 SERPL-MCNC: 40.8 NG/ML
ALBUMIN SERPL ELPH-MCNC: 4.6 G/DL
ALP BLD-CCNC: 56 U/L
ALT SERPL-CCNC: 13 U/L
ANION GAP SERPL CALC-SCNC: 11 MMOL/L
APPEARANCE: CLEAR
AST SERPL-CCNC: 19 U/L
BASOPHILS # BLD AUTO: 0.04 K/UL
BASOPHILS NFR BLD AUTO: 0.6 %
BILIRUB SERPL-MCNC: 0.4 MG/DL
BILIRUBIN URINE: NEGATIVE
BLOOD URINE: NEGATIVE
BUN SERPL-MCNC: 10 MG/DL
CALCIUM SERPL-MCNC: 9.7 MG/DL
CHLORIDE SERPL-SCNC: 104 MMOL/L
CHOLEST SERPL-MCNC: 199 MG/DL
CO2 SERPL-SCNC: 25 MMOL/L
COLOR: NORMAL
CREAT SERPL-MCNC: 0.75 MG/DL
EOSINOPHIL # BLD AUTO: 0.08 K/UL
EOSINOPHIL NFR BLD AUTO: 1.1 %
ERYTHROCYTE [SEDIMENTATION RATE] IN BLOOD BY WESTERGREN METHOD: 2 MM/HR
ESTIMATED AVERAGE GLUCOSE: 103 MG/DL
GLUCOSE QUALITATIVE U: NEGATIVE
GLUCOSE SERPL-MCNC: 90 MG/DL
HBA1C MFR BLD HPLC: 5.2 %
HCT VFR BLD CALC: 41.2 %
HDLC SERPL-MCNC: 47 MG/DL
HGB BLD-MCNC: 13.6 G/DL
IMM GRANULOCYTES NFR BLD AUTO: 0.1 %
KETONES URINE: NEGATIVE
LDLC SERPL CALC-MCNC: 131 MG/DL
LDLC SERPL DIRECT ASSAY-MCNC: 134 MG/DL
LEUKOCYTE ESTERASE URINE: NEGATIVE
LYMPHOCYTES # BLD AUTO: 1.9 K/UL
LYMPHOCYTES NFR BLD AUTO: 26.1 %
MAN DIFF?: NORMAL
MCHC RBC-ENTMCNC: 28.9 PG
MCHC RBC-ENTMCNC: 33 GM/DL
MCV RBC AUTO: 87.7 FL
MONOCYTES # BLD AUTO: 0.51 K/UL
MONOCYTES NFR BLD AUTO: 7 %
NEUTROPHILS # BLD AUTO: 4.73 K/UL
NEUTROPHILS NFR BLD AUTO: 65.1 %
NITRITE URINE: NEGATIVE
NONHDLC SERPL-MCNC: 152 MG/DL
PH URINE: 8
PLATELET # BLD AUTO: 246 K/UL
POTASSIUM SERPL-SCNC: 4.3 MMOL/L
PROT SERPL-MCNC: 7 G/DL
PROTEIN URINE: NORMAL
RBC # BLD: 4.7 M/UL
RBC # FLD: 12.4 %
SAVE SPECIMEN: NORMAL
SODIUM SERPL-SCNC: 140 MMOL/L
SPECIFIC GRAVITY URINE: 1.02
T3 SERPL-MCNC: 107 NG/DL
T3FREE SERPL-MCNC: 2.9 PG/ML
T3RU NFR SERPL: 1.1 TBI
T4 FREE SERPL-MCNC: 1.2 NG/DL
T4 SERPL-MCNC: 6.8 UG/DL
TRIGL SERPL-MCNC: 105 MG/DL
TSH SERPL-ACNC: 1.33 UIU/ML
UROBILINOGEN URINE: NORMAL
WBC # FLD AUTO: 7.27 K/UL

## 2021-01-11 LAB
SARS-COV-2 IGG SERPL IA-ACNC: 2.44 INDEX
SARS-COV-2 IGG SERPL QL IA: POSITIVE

## 2021-01-18 ENCOUNTER — APPOINTMENT (OUTPATIENT)
Dept: INTERNAL MEDICINE | Facility: CLINIC | Age: 51
End: 2021-01-18
Payer: COMMERCIAL

## 2021-01-18 PROCEDURE — 99072 ADDL SUPL MATRL&STAF TM PHE: CPT

## 2021-01-18 PROCEDURE — 93306 TTE W/DOPPLER COMPLETE: CPT

## 2021-01-18 PROCEDURE — 93306 TTE W/DOPPLER COMPLETE: CPT | Mod: 26

## 2021-01-19 ENCOUNTER — NON-APPOINTMENT (OUTPATIENT)
Age: 51
End: 2021-01-19

## 2021-01-19 NOTE — HEALTH RISK ASSESSMENT
[Patient reported mammogram was normal] : Patient reported mammogram was normal [Patient reported PAP Smear was normal] : Patient reported PAP Smear was normal [Good] : ~his/her~ current health as good [Intercurrent Urgi Care visits] : went to urgent care [Never (0 pts)] : Never (0 points) [No] : In the past 12 months have you used drugs other than those required for medical reasons? No [0] : 2) Feeling down, depressed, or hopeless: Not at all (0) [Patient reported bone density results were abnormal] : Patient reported bone density results were abnormal [Patient reported colonoscopy was normal] : Patient reported colonoscopy was normal [HIV test declined] : HIV test declined [Hepatitis C test declined] : Hepatitis C test declined [] : No [de-identified] : stopped due to headaches [de-identified] : seeing psychiatrist Dr. Kelvin Winkler q 4months  [Audit-CScore] : 0 [de-identified] : works out 6 days a week -does videos--cardio and weights [de-identified] : mostly vegetarian diet-has some fish and dairy [FreeTextEntry1] : feeling better on Lamictal [ERV5Banwk] : 0 [MammogramDate] : 01/21 [MammogramComments] : in [place] [PapSmearDate] : 12/20 [PapSmearComments] : with Dr. Aleman [BoneDensityDate] : 05/18 [BoneDensityComments] : T scores were -1.7 and -0.5 [ColonoscopyDate] : 04/18 [ColonoscopyComments] : with Dr. Quintanilla/ also had EGD

## 2021-01-19 NOTE — HISTORY OF PRESENT ILLNESS
[FreeTextEntry1] : Patient presents today for CPE.\par  [de-identified] : She got sick with COVID and noticed frequent high resting heart rate and elevated diastolic BP readings.\par She has an appt for CXR and pulmonary evaluation with Dr Craft later this week.\par .\par \par BP readings have been ~130/100's over the past few weeks.  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 Kelvin Winkler in [facility] 4months for anxiety--that got worse in 4/2020 after the start of the COVID 19 pandemic.  She did not respond well to SSRIs in the past. She was started on Lamictal 200mg which was increased recently to 250mg qd.  She is doing much better on this and rarely takes the Ativan prn.

## 2021-01-19 NOTE — PHYSICAL EXAM
[No Acute Distress] : no acute distress [Well Nourished] : well nourished [Well Developed] : well developed [Normal Sclera/Conjunctiva] : normal sclera/conjunctiva [Well-Appearing] : well-appearing [PERRL] : pupils equal round and reactive to light [EOMI] : extraocular movements intact [Normal Outer Ear/Nose] : the outer ears and nose were normal in appearance [No JVD] : no jugular venous distention [No Lymphadenopathy] : no lymphadenopathy [Supple] : supple [Thyroid Normal, No Nodules] : the thyroid was normal and there were no nodules present [No Respiratory Distress] : no respiratory distress  [No Accessory Muscle Use] : no accessory muscle use [Clear to Auscultation] : lungs were clear to auscultation bilaterally [Normal Rate] : normal rate  [Regular Rhythm] : with a regular rhythm [Normal S1, S2] : normal S1 and S2 [No Murmur] : no murmur heard [No Carotid Bruits] : no carotid bruits [No Abdominal Bruit] : a ~M bruit was not heard ~T in the abdomen [No Varicosities] : no varicosities [Pedal Pulses Present] : the pedal pulses are present [No Edema] : there was no peripheral edema [No Palpable Aorta] : no palpable aorta [No Extremity Clubbing/Cyanosis] : no extremity clubbing/cyanosis [Normal Appearance] : normal in appearance [No Axillary Lymphadenopathy] : no axillary lymphadenopathy [Soft] : abdomen soft [Non Tender] : non-tender [Non-distended] : non-distended [No Masses] : no abdominal mass palpated [No HSM] : no HSM [Normal Bowel Sounds] : normal bowel sounds [Normal Posterior Cervical Nodes] : no posterior cervical lymphadenopathy [Normal Anterior Cervical Nodes] : no anterior cervical lymphadenopathy [No CVA Tenderness] : no CVA  tenderness [No Spinal Tenderness] : no spinal tenderness [No Joint Swelling] : no joint swelling [Grossly Normal Strength/Tone] : grossly normal strength/tone [No Rash] : no rash [Coordination Grossly Intact] : coordination grossly intact [No Focal Deficits] : no focal deficits [Normal Gait] : normal gait [Normal Affect] : the affect was normal [Normal Insight/Judgement] : insight and judgment were intact

## 2021-01-29 ENCOUNTER — TRANSCRIPTION ENCOUNTER (OUTPATIENT)
Age: 51
End: 2021-01-29

## 2021-03-03 ENCOUNTER — RX RENEWAL (OUTPATIENT)
Age: 51
End: 2021-03-03

## 2021-04-07 ENCOUNTER — APPOINTMENT (OUTPATIENT)
Dept: INTERNAL MEDICINE | Facility: CLINIC | Age: 51
End: 2021-04-07
Payer: COMMERCIAL

## 2021-04-07 ENCOUNTER — LABORATORY RESULT (OUTPATIENT)
Age: 51
End: 2021-04-07

## 2021-04-07 VITALS
TEMPERATURE: 97.1 F | OXYGEN SATURATION: 99 % | DIASTOLIC BLOOD PRESSURE: 90 MMHG | HEART RATE: 72 BPM | SYSTOLIC BLOOD PRESSURE: 128 MMHG | HEIGHT: 62 IN | BODY MASS INDEX: 27.79 KG/M2 | WEIGHT: 151 LBS

## 2021-04-07 DIAGNOSIS — K21.9 GASTRO-ESOPHAGEAL REFLUX DISEASE W/OUT ESOPHAGITIS: ICD-10-CM

## 2021-04-07 DIAGNOSIS — K59.09 OTHER CONSTIPATION: ICD-10-CM

## 2021-04-07 DIAGNOSIS — E53.8 DEFICIENCY OF OTHER SPECIFIED B GROUP VITAMINS: ICD-10-CM

## 2021-04-07 PROCEDURE — 99072 ADDL SUPL MATRL&STAF TM PHE: CPT

## 2021-04-07 PROCEDURE — 36415 COLL VENOUS BLD VENIPUNCTURE: CPT

## 2021-04-07 PROCEDURE — 99213 OFFICE O/P EST LOW 20 MIN: CPT | Mod: 25

## 2021-04-08 PROBLEM — K59.09 CHRONIC CONSTIPATION: Status: ACTIVE | Noted: 2021-04-08

## 2021-04-08 PROBLEM — K21.9 GERD (GASTROESOPHAGEAL REFLUX DISEASE): Status: ACTIVE | Noted: 2018-03-29

## 2021-04-08 LAB
ALBUMIN SERPL ELPH-MCNC: 5.2 G/DL
ALP BLD-CCNC: 65 U/L
ALT SERPL-CCNC: 19 U/L
ANION GAP SERPL CALC-SCNC: 11 MMOL/L
AST SERPL-CCNC: 18 U/L
BASOPHILS # BLD AUTO: 0.08 K/UL
BASOPHILS NFR BLD AUTO: 1 %
BILIRUB SERPL-MCNC: 0.3 MG/DL
BUN SERPL-MCNC: 10 MG/DL
CALCIUM SERPL-MCNC: 9.7 MG/DL
CHLORIDE SERPL-SCNC: 106 MMOL/L
CO2 SERPL-SCNC: 27 MMOL/L
COVID-19 NUCLEOCAPSID  GAM ANTIBODY INTERPRETATION: POSITIVE
CREAT SERPL-MCNC: 0.73 MG/DL
EOSINOPHIL # BLD AUTO: 0.09 K/UL
EOSINOPHIL NFR BLD AUTO: 1.1 %
ESTIMATED AVERAGE GLUCOSE: 103 MG/DL
GLUCOSE SERPL-MCNC: 96 MG/DL
HBA1C MFR BLD HPLC: 5.2 %
HCT VFR BLD CALC: 44.1 %
HGB BLD-MCNC: 14.2 G/DL
IMM GRANULOCYTES NFR BLD AUTO: 0.2 %
LYMPHOCYTES # BLD AUTO: 2.28 K/UL
LYMPHOCYTES NFR BLD AUTO: 28.1 %
MAN DIFF?: NORMAL
MCHC RBC-ENTMCNC: 28.9 PG
MCHC RBC-ENTMCNC: 32.2 GM/DL
MCV RBC AUTO: 89.8 FL
MONOCYTES # BLD AUTO: 0.66 K/UL
MONOCYTES NFR BLD AUTO: 8.1 %
NEUTROPHILS # BLD AUTO: 4.99 K/UL
NEUTROPHILS NFR BLD AUTO: 61.5 %
PLATELET # BLD AUTO: 228 K/UL
POTASSIUM SERPL-SCNC: 4.7 MMOL/L
PROT SERPL-MCNC: 7.3 G/DL
RBC # BLD: 4.91 M/UL
RBC # FLD: 12.6 %
SARS-COV-2 AB SERPL QL IA: 59.7 INDEX
SAVE SPECIMEN: NORMAL
SODIUM SERPL-SCNC: 144 MMOL/L
T3 SERPL-MCNC: 111 NG/DL
T3FREE SERPL-MCNC: 3.26 PG/ML
T3RU NFR SERPL: 1.1 TBI
T4 FREE SERPL-MCNC: 1.2 NG/DL
T4 SERPL-MCNC: 7.3 UG/DL
TSH SERPL-ACNC: 1.79 UIU/ML
VIT B12 SERPL-MCNC: 572 PG/ML
WBC # FLD AUTO: 8.12 K/UL

## 2021-04-08 RX ORDER — ONDANSETRON 8 MG/1
8 TABLET ORAL EVERY 8 HOURS
Qty: 42 | Refills: 0 | Status: COMPLETED | COMMUNITY
Start: 2020-01-08 | End: 2021-04-08

## 2021-04-08 NOTE — HISTORY OF PRESENT ILLNESS
[FreeTextEntry1] : pt presents for f/u for recent labile Hypertension , newly diagnosed sleep apnea and symptoms of chronic constipation, and  loss of taste and smell after COVID infection [de-identified] : She got sick with COVID and noticed frequent high resting heart rate and elevated diastolic BP readings.\par She had an appt for CXR and pulmonary evaluation with Dr Craft.\par Her asthma is stable and maintained on Breo Ellipta, Singulair and Albuterol prn.\par She underwent a sleep study and was diagnosed with obstructive sleep apnea-wears a CPAP device covering her nasal passage at night.  She is having fewer apneic episodes at night.  She also sees ENT for the SUGAR.\par \par BP readings have been ~130-140's/80-90's over the past few weeks.  No chest pains or headaches. She is following a low salt diet and continues to remain very active-exercises 6 days a week-does cardio and weights.\par \par She is currently working on her Masters in Addiction and Recovery services at [employer] in [place]. 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 She can smell but her sense of taste has only minimally returned.\par \par She sees a psychiatrist Dr. Kelvin Winkler in [facility] 4months for anxiety--that got worse in 4/2020 after the start of the COVID 19 pandemic.  She did not respond well to SSRIs in the past. She was started on Lamictal 200mg which was increased recently to 250mg qd.  She is doing much better on this and rarely takes the Ativan prn.

## 2021-04-08 NOTE — PHYSICAL EXAM
[No Acute Distress] : no acute distress [Well Nourished] : well nourished [Well Developed] : well developed [Well-Appearing] : well-appearing [Normal Sclera/Conjunctiva] : normal sclera/conjunctiva [PERRL] : pupils equal round and reactive to light [EOMI] : extraocular movements intact [Normal Outer Ear/Nose] : the outer ears and nose were normal in appearance [No JVD] : no jugular venous distention [No Lymphadenopathy] : no lymphadenopathy [Supple] : supple [Thyroid Normal, No Nodules] : the thyroid was normal and there were no nodules present [No Respiratory Distress] : no respiratory distress  [No Accessory Muscle Use] : no accessory muscle use [Clear to Auscultation] : lungs were clear to auscultation bilaterally [Normal Rate] : normal rate  [Regular Rhythm] : with a regular rhythm [Normal S1, S2] : normal S1 and S2 [No Murmur] : no murmur heard [No Carotid Bruits] : no carotid bruits [No Abdominal Bruit] : a ~M bruit was not heard ~T in the abdomen [No Varicosities] : no varicosities [Pedal Pulses Present] : the pedal pulses are present [No Edema] : there was no peripheral edema [No Palpable Aorta] : no palpable aorta [No Extremity Clubbing/Cyanosis] : no extremity clubbing/cyanosis [Soft] : abdomen soft [Non Tender] : non-tender [Non-distended] : non-distended [No Masses] : no abdominal mass palpated [No HSM] : no HSM [Normal Bowel Sounds] : normal bowel sounds [Normal Posterior Cervical Nodes] : no posterior cervical lymphadenopathy [Normal Anterior Cervical Nodes] : no anterior cervical lymphadenopathy [No CVA Tenderness] : no CVA  tenderness [No Spinal Tenderness] : no spinal tenderness [No Joint Swelling] : no joint swelling [Grossly Normal Strength/Tone] : grossly normal strength/tone [No Rash] : no rash [Coordination Grossly Intact] : coordination grossly intact [No Focal Deficits] : no focal deficits [Normal Gait] : normal gait [Deep Tendon Reflexes (DTR)] : deep tendon reflexes were 2+ and symmetric [Normal Affect] : the affect was normal [Normal Insight/Judgement] : insight and judgment were intact

## 2021-04-28 ENCOUNTER — NON-APPOINTMENT (OUTPATIENT)
Age: 51
End: 2021-04-28

## 2021-05-06 ENCOUNTER — TRANSCRIPTION ENCOUNTER (OUTPATIENT)
Age: 51
End: 2021-05-06

## 2021-05-14 ENCOUNTER — NON-APPOINTMENT (OUTPATIENT)
Age: 51
End: 2021-05-14

## 2021-05-14 ENCOUNTER — TRANSCRIPTION ENCOUNTER (OUTPATIENT)
Age: 51
End: 2021-05-14

## 2021-06-17 ENCOUNTER — APPOINTMENT (OUTPATIENT)
Dept: INTERNAL MEDICINE | Facility: CLINIC | Age: 51
End: 2021-06-17
Payer: COMMERCIAL

## 2021-06-17 ENCOUNTER — TRANSCRIPTION ENCOUNTER (OUTPATIENT)
Age: 51
End: 2021-06-17

## 2021-06-17 ENCOUNTER — LABORATORY RESULT (OUTPATIENT)
Age: 51
End: 2021-06-17

## 2021-06-17 VITALS
SYSTOLIC BLOOD PRESSURE: 118 MMHG | HEART RATE: 91 BPM | WEIGHT: 151 LBS | OXYGEN SATURATION: 98 % | HEIGHT: 62 IN | DIASTOLIC BLOOD PRESSURE: 70 MMHG | BODY MASS INDEX: 27.79 KG/M2 | TEMPERATURE: 98.1 F

## 2021-06-17 PROCEDURE — 36415 COLL VENOUS BLD VENIPUNCTURE: CPT

## 2021-06-17 PROCEDURE — 81002 URINALYSIS NONAUTO W/O SCOPE: CPT

## 2021-06-17 PROCEDURE — 99072 ADDL SUPL MATRL&STAF TM PHE: CPT

## 2021-06-17 PROCEDURE — 99213 OFFICE O/P EST LOW 20 MIN: CPT | Mod: 25

## 2021-06-18 ENCOUNTER — TRANSCRIPTION ENCOUNTER (OUTPATIENT)
Age: 51
End: 2021-06-18

## 2021-06-18 DIAGNOSIS — R35.0 FREQUENCY OF MICTURITION: ICD-10-CM

## 2021-06-18 DIAGNOSIS — Z87.898 PERSONAL HISTORY OF OTHER SPECIFIED CONDITIONS: ICD-10-CM

## 2021-06-21 ENCOUNTER — TRANSCRIPTION ENCOUNTER (OUTPATIENT)
Age: 51
End: 2021-06-21

## 2021-06-21 ENCOUNTER — NON-APPOINTMENT (OUTPATIENT)
Age: 51
End: 2021-06-21

## 2021-06-21 LAB
25(OH)D3 SERPL-MCNC: 37.3 NG/ML
ALBUMIN SERPL ELPH-MCNC: 4.4 G/DL
ALP BLD-CCNC: 64 U/L
ALT SERPL-CCNC: 56 U/L
ANION GAP SERPL CALC-SCNC: 13 MMOL/L
AST SERPL-CCNC: 54 U/L
B BURGDOR IGG+IGM SER QL IB: NORMAL
BACTERIA UR CULT: NORMAL
BASOPHILS # BLD AUTO: 0.02 K/UL
BASOPHILS NFR BLD AUTO: 0.7 %
BILIRUB SERPL-MCNC: 0.3 MG/DL
BILIRUB UR QL STRIP: NEGATIVE
BUN SERPL-MCNC: 9 MG/DL
CALCIUM SERPL-MCNC: 9.5 MG/DL
CHLORIDE SERPL-SCNC: 99 MMOL/L
CO2 SERPL-SCNC: 22 MMOL/L
CREAT SERPL-MCNC: 0.62 MG/DL
EOSINOPHIL # BLD AUTO: 0 K/UL
EOSINOPHIL NFR BLD AUTO: 0 %
GLUCOSE SERPL-MCNC: 105 MG/DL
GLUCOSE UR-MCNC: NEGATIVE
HCG UR QL: 0.2 EU/DL
HCT VFR BLD CALC: 38.6 %
HGB BLD-MCNC: 12.9 G/DL
HGB UR QL STRIP.AUTO: ABNORMAL
IMM GRANULOCYTES NFR BLD AUTO: 0 %
KETONES UR-MCNC: NEGATIVE
LEUKOCYTE ESTERASE UR QL STRIP: NEGATIVE
LYMPHOCYTES # BLD AUTO: 0.47 K/UL
LYMPHOCYTES NFR BLD AUTO: 15.8 %
MAN DIFF?: NORMAL
MCHC RBC-ENTMCNC: 28.3 PG
MCHC RBC-ENTMCNC: 33.4 GM/DL
MCV RBC AUTO: 84.6 FL
MONOCYTES # BLD AUTO: 0.3 K/UL
MONOCYTES NFR BLD AUTO: 10.1 %
NEUTROPHILS # BLD AUTO: 2.19 K/UL
NEUTROPHILS NFR BLD AUTO: 73.4 %
NITRITE UR QL STRIP: NEGATIVE
PH UR STRIP: 7.5
PLATELET # BLD AUTO: 131 K/UL
POTASSIUM SERPL-SCNC: 3.9 MMOL/L
PROT SERPL-MCNC: 6.9 G/DL
PROT UR STRIP-MCNC: NEGATIVE
RBC # BLD: 4.56 M/UL
RBC # FLD: 12.9 %
SARS-COV-2 N GENE NPH QL NAA+PROBE: NOT DETECTED
SODIUM SERPL-SCNC: 135 MMOL/L
SP GR UR STRIP: 1.01
WBC # FLD AUTO: 2.98 K/UL

## 2021-06-22 ENCOUNTER — INPATIENT (INPATIENT)
Facility: HOSPITAL | Age: 51
LOS: 1 days | Discharge: HOME | DRG: 866 | End: 2021-06-24
Attending: HOSPITALIST | Admitting: HOSPITALIST
Payer: COMMERCIAL

## 2021-06-22 VITALS
RESPIRATION RATE: 20 BRPM | TEMPERATURE: 101 F | SYSTOLIC BLOOD PRESSURE: 106 MMHG | HEIGHT: 63 IN | DIASTOLIC BLOOD PRESSURE: 58 MMHG | WEIGHT: 149.91 LBS | OXYGEN SATURATION: 100 % | HEART RATE: 113 BPM

## 2021-06-22 DIAGNOSIS — R65.10 SYSTEMIC INFLAMMATORY RESPONSE SYNDROME (SIRS) OF NON-INFECTIOUS ORIGIN WITHOUT ACUTE ORGAN DYSFUNCTION: ICD-10-CM

## 2021-06-22 DIAGNOSIS — Z29.9 ENCOUNTER FOR PROPHYLACTIC MEASURES, UNSPECIFIED: ICD-10-CM

## 2021-06-22 DIAGNOSIS — R74.01 ELEVATION OF LEVELS OF LIVER TRANSAMINASE LEVELS: ICD-10-CM

## 2021-06-22 DIAGNOSIS — J45.909 UNSPECIFIED ASTHMA, UNCOMPLICATED: ICD-10-CM

## 2021-06-22 DIAGNOSIS — I10 ESSENTIAL (PRIMARY) HYPERTENSION: ICD-10-CM

## 2021-06-22 DIAGNOSIS — F39 UNSPECIFIED MOOD [AFFECTIVE] DISORDER: ICD-10-CM

## 2021-06-22 DIAGNOSIS — R50.9 FEVER, UNSPECIFIED: ICD-10-CM

## 2021-06-22 LAB
ALBUMIN SERPL ELPH-MCNC: 4.3 G/DL — SIGNIFICANT CHANGE UP (ref 3.3–5)
ALP SERPL-CCNC: 81 U/L — SIGNIFICANT CHANGE UP (ref 40–120)
ALT FLD-CCNC: 362 U/L — HIGH (ref 10–45)
ANION GAP SERPL CALC-SCNC: 14 MMOL/L — SIGNIFICANT CHANGE UP (ref 5–17)
APPEARANCE UR: CLEAR — SIGNIFICANT CHANGE UP
APTT BLD: 29.8 SEC — SIGNIFICANT CHANGE UP (ref 27.5–35.5)
AST SERPL-CCNC: 169 U/L — HIGH (ref 10–40)
BACTERIA # UR AUTO: NEGATIVE — SIGNIFICANT CHANGE UP
BASE EXCESS BLDV CALC-SCNC: 2.8 MMOL/L — HIGH (ref -2–2)
BASOPHILS # BLD AUTO: 0.03 K/UL — SIGNIFICANT CHANGE UP (ref 0–0.2)
BASOPHILS NFR BLD AUTO: 0.4 % — SIGNIFICANT CHANGE UP (ref 0–2)
BILIRUB SERPL-MCNC: 0.4 MG/DL — SIGNIFICANT CHANGE UP (ref 0.2–1.2)
BILIRUB UR-MCNC: NEGATIVE — SIGNIFICANT CHANGE UP
BUN SERPL-MCNC: 15 MG/DL — SIGNIFICANT CHANGE UP (ref 7–23)
CA-I SERPL-SCNC: 1.14 MMOL/L — SIGNIFICANT CHANGE UP (ref 1.12–1.3)
CALCIUM SERPL-MCNC: 9.6 MG/DL — SIGNIFICANT CHANGE UP (ref 8.4–10.5)
CHLORIDE BLDV-SCNC: 105 MMOL/L — SIGNIFICANT CHANGE UP (ref 96–108)
CHLORIDE SERPL-SCNC: 102 MMOL/L — SIGNIFICANT CHANGE UP (ref 96–108)
CO2 BLDV-SCNC: 28 MMOL/L — SIGNIFICANT CHANGE UP (ref 22–30)
CO2 SERPL-SCNC: 22 MMOL/L — SIGNIFICANT CHANGE UP (ref 22–31)
COLOR SPEC: YELLOW — SIGNIFICANT CHANGE UP
CREAT SERPL-MCNC: 0.82 MG/DL — SIGNIFICANT CHANGE UP (ref 0.5–1.3)
DIFF PNL FLD: ABNORMAL
EOSINOPHIL # BLD AUTO: 0.02 K/UL — SIGNIFICANT CHANGE UP (ref 0–0.5)
EOSINOPHIL NFR BLD AUTO: 0.3 % — SIGNIFICANT CHANGE UP (ref 0–6)
EPI CELLS # UR: 2 /HPF — SIGNIFICANT CHANGE UP
ETHANOL SERPL-MCNC: SIGNIFICANT CHANGE UP MG/DL (ref 0–10)
GAS PNL BLDV: 137 MMOL/L — SIGNIFICANT CHANGE UP (ref 135–145)
GAS PNL BLDV: SIGNIFICANT CHANGE UP
GLUCOSE BLDV-MCNC: 124 MG/DL — HIGH (ref 70–99)
GLUCOSE SERPL-MCNC: 129 MG/DL — HIGH (ref 70–99)
GLUCOSE UR QL: NEGATIVE — SIGNIFICANT CHANGE UP
HCG SERPL-ACNC: <2 MIU/ML — SIGNIFICANT CHANGE UP
HCO3 BLDV-SCNC: 27 MMOL/L — SIGNIFICANT CHANGE UP (ref 21–29)
HCT VFR BLD CALC: 35.9 % — SIGNIFICANT CHANGE UP (ref 34.5–45)
HCT VFR BLDA CALC: 40 % — SIGNIFICANT CHANGE UP (ref 39–50)
HGB BLD CALC-MCNC: 13 G/DL — SIGNIFICANT CHANGE UP (ref 11.5–15.5)
HGB BLD-MCNC: 12.2 G/DL — SIGNIFICANT CHANGE UP (ref 11.5–15.5)
HYALINE CASTS # UR AUTO: 0 /LPF — SIGNIFICANT CHANGE UP (ref 0–2)
IMM GRANULOCYTES NFR BLD AUTO: 0.4 % — SIGNIFICANT CHANGE UP (ref 0–1.5)
INR BLD: 1.12 RATIO — SIGNIFICANT CHANGE UP (ref 0.88–1.16)
KETONES UR-MCNC: NEGATIVE — SIGNIFICANT CHANGE UP
LACTATE BLDV-MCNC: 1.9 MMOL/L — SIGNIFICANT CHANGE UP (ref 0.7–2)
LEUKOCYTE ESTERASE UR-ACNC: NEGATIVE — SIGNIFICANT CHANGE UP
LYMPHOCYTES # BLD AUTO: 1.66 K/UL — SIGNIFICANT CHANGE UP (ref 1–3.3)
LYMPHOCYTES # BLD AUTO: 22.8 % — SIGNIFICANT CHANGE UP (ref 13–44)
MAGNESIUM SERPL-MCNC: 2.1 MG/DL — SIGNIFICANT CHANGE UP (ref 1.6–2.6)
MCHC RBC-ENTMCNC: 28.1 PG — SIGNIFICANT CHANGE UP (ref 27–34)
MCHC RBC-ENTMCNC: 34 GM/DL — SIGNIFICANT CHANGE UP (ref 32–36)
MCV RBC AUTO: 82.7 FL — SIGNIFICANT CHANGE UP (ref 80–100)
MONOCYTES # BLD AUTO: 0.75 K/UL — SIGNIFICANT CHANGE UP (ref 0–0.9)
MONOCYTES NFR BLD AUTO: 10.3 % — SIGNIFICANT CHANGE UP (ref 2–14)
NEUTROPHILS # BLD AUTO: 4.78 K/UL — SIGNIFICANT CHANGE UP (ref 1.8–7.4)
NEUTROPHILS NFR BLD AUTO: 65.8 % — SIGNIFICANT CHANGE UP (ref 43–77)
NITRITE UR-MCNC: NEGATIVE — SIGNIFICANT CHANGE UP
NRBC # BLD: 0 /100 WBCS — SIGNIFICANT CHANGE UP (ref 0–0)
PCO2 BLDV: 42 MMHG — SIGNIFICANT CHANGE UP (ref 35–50)
PH BLDV: 7.42 — SIGNIFICANT CHANGE UP (ref 7.35–7.45)
PH UR: 6 — SIGNIFICANT CHANGE UP (ref 5–8)
PHOSPHATE SERPL-MCNC: 2.5 MG/DL — SIGNIFICANT CHANGE UP (ref 2.5–4.5)
PLATELET # BLD AUTO: 150 K/UL — SIGNIFICANT CHANGE UP (ref 150–400)
PO2 BLDV: 36 MMHG — SIGNIFICANT CHANGE UP (ref 25–45)
POTASSIUM BLDV-SCNC: 3.4 MMOL/L — LOW (ref 3.5–5.3)
POTASSIUM SERPL-MCNC: 3.7 MMOL/L — SIGNIFICANT CHANGE UP (ref 3.5–5.3)
POTASSIUM SERPL-SCNC: 3.7 MMOL/L — SIGNIFICANT CHANGE UP (ref 3.5–5.3)
PROCALCITONIN SERPL-MCNC: 0.4 NG/ML — HIGH (ref 0.02–0.1)
PROT SERPL-MCNC: 7.1 G/DL — SIGNIFICANT CHANGE UP (ref 6–8.3)
PROT UR-MCNC: ABNORMAL
PROTHROM AB SERPL-ACNC: 13.4 SEC — SIGNIFICANT CHANGE UP (ref 10.6–13.6)
RAPID RVP RESULT: SIGNIFICANT CHANGE UP
RBC # BLD: 4.34 M/UL — SIGNIFICANT CHANGE UP (ref 3.8–5.2)
RBC # FLD: 13 % — SIGNIFICANT CHANGE UP (ref 10.3–14.5)
RBC CASTS # UR COMP ASSIST: 10 /HPF — HIGH (ref 0–4)
SALICYLATES SERPL-MCNC: <2 MG/DL — LOW (ref 15–30)
SAO2 % BLDV: 68 % — SIGNIFICANT CHANGE UP (ref 67–88)
SARS-COV-2 RNA SPEC QL NAA+PROBE: SIGNIFICANT CHANGE UP
SODIUM SERPL-SCNC: 138 MMOL/L — SIGNIFICANT CHANGE UP (ref 135–145)
SP GR SPEC: 1.02 — SIGNIFICANT CHANGE UP (ref 1.01–1.02)
UROBILINOGEN FLD QL: NEGATIVE — SIGNIFICANT CHANGE UP
WBC # BLD: 7.27 K/UL — SIGNIFICANT CHANGE UP (ref 3.8–10.5)
WBC # FLD AUTO: 7.27 K/UL — SIGNIFICANT CHANGE UP (ref 3.8–10.5)
WBC UR QL: 2 /HPF — SIGNIFICANT CHANGE UP (ref 0–5)

## 2021-06-22 PROCEDURE — 99223 1ST HOSP IP/OBS HIGH 75: CPT | Mod: GC

## 2021-06-22 PROCEDURE — 93010 ELECTROCARDIOGRAM REPORT: CPT

## 2021-06-22 PROCEDURE — 99223 1ST HOSP IP/OBS HIGH 75: CPT

## 2021-06-22 PROCEDURE — 99285 EMERGENCY DEPT VISIT HI MDM: CPT

## 2021-06-22 PROCEDURE — 76705 ECHO EXAM OF ABDOMEN: CPT | Mod: 26,RT

## 2021-06-22 PROCEDURE — 71045 X-RAY EXAM CHEST 1 VIEW: CPT | Mod: 26

## 2021-06-22 RX ORDER — LOSARTAN POTASSIUM 100 MG/1
0.5 TABLET, FILM COATED ORAL
Qty: 0 | Refills: 0 | DISCHARGE

## 2021-06-22 RX ORDER — CEFTRIAXONE 500 MG/1
1000 INJECTION, POWDER, FOR SOLUTION INTRAMUSCULAR; INTRAVENOUS EVERY 24 HOURS
Refills: 0 | Status: DISCONTINUED | OUTPATIENT
Start: 2021-06-23 | End: 2021-06-23

## 2021-06-22 RX ORDER — SODIUM CHLORIDE 9 MG/ML
1000 INJECTION INTRAMUSCULAR; INTRAVENOUS; SUBCUTANEOUS
Refills: 0 | Status: DISCONTINUED | OUTPATIENT
Start: 2021-06-22 | End: 2021-06-23

## 2021-06-22 RX ORDER — FLUTICASONE FUROATE AND VILANTEROL TRIFENATATE 100; 25 UG/1; UG/1
1 POWDER RESPIRATORY (INHALATION)
Qty: 0 | Refills: 0 | DISCHARGE

## 2021-06-22 RX ORDER — MONTELUKAST 4 MG/1
1 TABLET, CHEWABLE ORAL
Qty: 0 | Refills: 0 | DISCHARGE

## 2021-06-22 RX ORDER — ACETAMINOPHEN 500 MG
975 TABLET ORAL ONCE
Refills: 0 | Status: COMPLETED | OUTPATIENT
Start: 2021-06-22 | End: 2021-06-22

## 2021-06-22 RX ORDER — LAMOTRIGINE 25 MG/1
250 TABLET, ORALLY DISINTEGRATING ORAL DAILY
Refills: 0 | Status: DISCONTINUED | OUTPATIENT
Start: 2021-06-22 | End: 2021-06-24

## 2021-06-22 RX ORDER — METRONIDAZOLE 500 MG
500 TABLET ORAL EVERY 8 HOURS
Refills: 0 | Status: DISCONTINUED | OUTPATIENT
Start: 2021-06-23 | End: 2021-06-23

## 2021-06-22 RX ORDER — LAMOTRIGINE 25 MG/1
1 TABLET, ORALLY DISINTEGRATING ORAL
Qty: 0 | Refills: 0 | DISCHARGE

## 2021-06-22 RX ORDER — BUDESONIDE AND FORMOTEROL FUMARATE DIHYDRATE 160; 4.5 UG/1; UG/1
2 AEROSOL RESPIRATORY (INHALATION)
Refills: 0 | Status: DISCONTINUED | OUTPATIENT
Start: 2021-06-22 | End: 2021-06-24

## 2021-06-22 RX ORDER — CEFTRIAXONE 500 MG/1
1000 INJECTION, POWDER, FOR SOLUTION INTRAMUSCULAR; INTRAVENOUS ONCE
Refills: 0 | Status: COMPLETED | OUTPATIENT
Start: 2021-06-22 | End: 2021-06-22

## 2021-06-22 RX ORDER — METRONIDAZOLE 500 MG
500 TABLET ORAL ONCE
Refills: 0 | Status: COMPLETED | OUTPATIENT
Start: 2021-06-22 | End: 2021-06-22

## 2021-06-22 RX ORDER — ACETAMINOPHEN 500 MG
650 TABLET ORAL EVERY 6 HOURS
Refills: 0 | Status: DISCONTINUED | OUTPATIENT
Start: 2021-06-22 | End: 2021-06-24

## 2021-06-22 RX ORDER — SODIUM CHLORIDE 9 MG/ML
2000 INJECTION INTRAMUSCULAR; INTRAVENOUS; SUBCUTANEOUS ONCE
Refills: 0 | Status: COMPLETED | OUTPATIENT
Start: 2021-06-22 | End: 2021-06-22

## 2021-06-22 RX ORDER — MONTELUKAST 4 MG/1
10 TABLET, CHEWABLE ORAL DAILY
Refills: 0 | Status: DISCONTINUED | OUTPATIENT
Start: 2021-06-23 | End: 2021-06-24

## 2021-06-22 RX ORDER — LAMOTRIGINE 25 MG/1
0 TABLET, ORALLY DISINTEGRATING ORAL
Qty: 0 | Refills: 0 | DISCHARGE

## 2021-06-22 RX ADMIN — SODIUM CHLORIDE 125 MILLILITER(S): 9 INJECTION INTRAMUSCULAR; INTRAVENOUS; SUBCUTANEOUS at 17:53

## 2021-06-22 RX ADMIN — SODIUM CHLORIDE 2000 MILLILITER(S): 9 INJECTION INTRAMUSCULAR; INTRAVENOUS; SUBCUTANEOUS at 14:40

## 2021-06-22 RX ADMIN — CEFTRIAXONE 100 MILLIGRAM(S): 500 INJECTION, POWDER, FOR SOLUTION INTRAMUSCULAR; INTRAVENOUS at 17:53

## 2021-06-22 RX ADMIN — Medication 110 MILLIGRAM(S): at 14:47

## 2021-06-22 RX ADMIN — Medication 975 MILLIGRAM(S): at 18:03

## 2021-06-22 RX ADMIN — Medication 975 MILLIGRAM(S): at 14:41

## 2021-06-22 RX ADMIN — Medication 100 MILLIGRAM(S): at 17:53

## 2021-06-22 NOTE — ED ADULT NURSE REASSESSMENT NOTE - NS ED NURSE REASSESS COMMENT FT1
Report received from SHYANN Hernandez. Pt A+Ox3, resting comfortably in stretcher, call bell in reach. Pt and family at bedside aware of plan of care, admitted to medicine for fevers and chills; pending admission bed.

## 2021-06-22 NOTE — H&P ADULT - NSHPLABSRESULTS_GEN_ALL_CORE
LABS: Personally reviewed labs, imaging, and ECG             EKG - NSR  83, no ST-T changes, QTc 448                 12.2   7.27  )-----------( 150      ( 2021 14:48 )             35.9     Complete Blood Count + Automated Diff (21 @ 14:48)    WBC Count: 7.27 K/uL    RBC Count: 4.34 M/uL    Hemoglobin: 12.2 g/dL    Hematocrit: 35.9 %    Mean Cell Volume: 82.7 fl    Mean Cell Hemoglobin: 28.1 pg    Mean Cell Hemoglobin Conc: 34.0 gm/dL    Red Cell Distrib Width: 13.0 %    Platelet Count - Automated: 150 K/uL    Auto Neutrophil #: 4.78 K/uL    Auto Lymphocyte #: 1.66 K/uL    Auto Monocyte #: 0.75 K/uL    Auto Eosinophil #: 0.02 K/uL    Auto Basophil #: 0.03 K/uL        138  |  102  |  15  ----------------------------<  129<H>  3.7   |  22  |  0.82    Ca    9.6      2021 14:48  Phos  2.5       Mg     2.1         TPro  7.1  /  Alb  4.3  /  TBili  0.4  /  DBili  x   /  AST  169<H>  /  ALT  362<H>  /  AlkPhos  81  22       LIVER FUNCTIONS - ( 2021 14:48 )  Alb: 4.3 g/dL / Pro: 7.1 g/dL / ALK PHOS: 81 U/L / ALT: 362 U/L / AST: 169 U/L / GGT: x              Urinalysis Basic - ( 2021 15:08 )  Color: Yellow / Appearance: Clear / S.023 / pH: x  Gluc: x / Ketone: Negative  / Bili: Negative / Urobili: Negative   Blood: x / Protein: Trace / Nitrite: Negative   Leuk Esterase: Negative / RBC: 10 /hpf / WBC 2 /HPF   Sq Epi: x / Non Sq Epi: 2 /hpf / Bacteria: Negative    PT/INR - ( 2021 14:48 )   PT: 13.4 sec;   INR: 1.12 ratio    PTT - ( 2021 14:48 )  PTT:29.8 sec    15:03 - VBG - pH: 7.42  | pCO2: 42    | pO2: 36    | Lactate: 1.9        Procalcitonin, Serum (21 @ 14:48)    Procalcitonin, Serum: 0.40: This assay is intended for use to determine the change of PCT over time  as an aid in assessing the cumulative 28-day risk of all-cause mortality  for patients diagnosed with severe sepsis or septic shock in the ICU, or  when obtained in the emergency department or other medical wards prior to  ICU admission. This assay was performed by the Roche GenbookS PCT  assay. ng/mL    Rapid RVP Result: NotDetec (21 @ 14:41)    Alcohol, Blood (21 @ 16:02)    Alcohol, Blood: NEG mg/dL  Acetaminophen Level, Serum (21 @ 16:02)    Acetaminophen Level, Serum: <15 ug/mL      RADIOLOGY & ADDITIONAL TESTS:    < from: US Abdomen Upper Quadrant Right (21 @ 17:46) >      INTERPRETATION:  CLINICAL INFORMATION: Transaminitis.    COMPARISON: Abdominal ultrasound 2020.    TECHNIQUE: Sonography of the right upper quadrant.    FINDINGS:    Liver: Within normal limits.  Bile ducts: Normal caliber. Common bile duct measures 2 mm.  Gallbladder: Contracted.  Pancreas: Visualized portions are within normal limits.  Right kidney: 10.8 cm. No hydronephrosis. Redemonstration of a 1.4 cm septated cyst within the right kidney, appears unchanged from 2020.  Ascites: None.  IVC: Visualized portions are within normal limits.    IMPRESSION:  Unremarkable. Septated cyst is again seen in the right kidney.    < end of copied text >

## 2021-06-22 NOTE — H&P ADULT - PROBLEM SELECTOR PLAN 1
-Pt presented with fever and chills for a week, usually once a day, Tmax 103. Tachycardic on admission meeting SIRS criteria without clear source. History is positive for recent nature exposure and pet dog.   -ROS negative for any clear source of infection beside coinciding elevated LFTs  -UA negative, CXR clear lungs.  -COVID neg, RVP neg.  -Procal 0.4  -Lactate 1.9 on admission, s/p 2L bolus in the ED  -On the DDx: tick borne illness vs. viral/bacteria illness, less likely inflammatory/autoimmune  -f/u Bcx x2, Ucx  -f/u serology EBV, CMV, hepatitis, ehrlichiosis, babesia  -Will cw empiric converge with CTX 1g qd and flagyl 500mg q8h per ID  -Will consider further imaging if perlim workup is negative.    -ID consulted, appreciate recs.

## 2021-06-22 NOTE — H&P ADULT - PROBLEM SELECTOR PLAN 2
-AST//362 with increase from ~50s on 6/17  -Rest of LFTs wnl (alb/ALP/INR)  -No abdominal pain/tenderness, son with celiac but pt tested negative serology  -U/S abdomen showing renal cyst, liver unremarkable.   -Acetaminophen/alcohol levels normal  -f/u hepatitis, EBV, CMV  -will trend cmp qd

## 2021-06-22 NOTE — ED ADULT NURSE NOTE - OBJECTIVE STATEMENT
52 yo female PMH asthma, HTN presenting to ED with one week of intermittent fevers. Endorsing chest heaviness and intermittent SOB but attributes both to fever. Went to PCP last week had neg Lyme and COVID tests. Confirms hiking in house [facility], but denies noticing any bites, rashes, breaks in skin. No sick contacts at home. Vaccinated for COVID. No other associated symptoms. Code sepsis called, two large bore IVS started, blood cultures drawn x 2, IVF started per order. Denies current CP, current SOB, n/v/d, abdominal pain, urinary symptoms,  numbness, tingling in upper and lower extremities, HA, blurry vision. VSS updated on plan of care.

## 2021-06-22 NOTE — ED CLERICAL - NS ED CLERK NOTE PRE-ARRIVAL INFORMATION; ADDITIONAL PRE-ARRIVAL INFORMATION
/CC/Reason For referral: fever; chills; pcr covid negative 6/17/21  Preferred Consultant(if applicable):  Who admits for you (if needed):  Do you have documents you would like to fax over?  Would you still like to speak to an ED attending? NO

## 2021-06-22 NOTE — ED PROVIDER NOTE - CLINICAL SUMMARY MEDICAL DECISION MAKING FREE TEXT BOX
51 yrs female with asthma and HTN p/w 1 week of fever /chills with chest heaviness . Recent travel to [facility] .Negative test for lyme out pt , UA and Covid by PMD  continue to have high grade fever - concern for tick disease , Ehrlichia, Babsisa. Will send the titre, IVF and doxycycline and reassess. ZR

## 2021-06-22 NOTE — H&P ADULT - NSHPPHYSICALEXAM_GEN_ALL_CORE
VITALS:  T(C): 36.8 (06-22-21 @ 18:00), Max: 38.6 (06-22-21 @ 14:04)  HR: 78 (06-22-21 @ 18:00) (78 - 113)  BP: 93/62 (06-22-21 @ 18:00) (89/56 - 108/93)  RR: 18 (06-22-21 @ 18:00) (15 - 25)  SpO2: 98% (06-22-21 @ 18:00) (98% - 100%)    PHYSICAL EXAM:  GENERAL: NAD, well-developed  HEAD:  Atraumatic, Normocephalic  EYES: EOMI, PERRLA, conjunctiva and sclera clear  NECK: Supple, No JVD, no LAD, no meningeal signs.  CHEST/LUNG: Clear to auscultation bilaterally; No wheeze  HEART: Regular rate and rhythm; No murmurs, rubs, or gallops  ABDOMEN: Soft, Nontender, Nondistended; Bowel sounds present, ponce neg  EXTREMITIES:, No clubbing, cyanosis, or edema, no joint tenderness/swelling  PSYCH: AAOx3  NEUROLOGY: non-focal  SKIN: No rashes or lesions, no CVA tenderness

## 2021-06-22 NOTE — ED PROVIDER NOTE - SEVERE SEPSIS ALERT DETAILS 2
CLARKE eRis:  I have seen and evaluated this patient and do not believe the patient is septic at this time.  I will continue to evaluate.

## 2021-06-22 NOTE — H&P ADULT - PROBLEM SELECTOR PLAN 5
DVT: holding as pt low risk and expect to be mobile  Diet: DASH  Dispo: pending workup -cw home Lamotrigine

## 2021-06-22 NOTE — H&P ADULT - NSHPREVIEWOFSYSTEMS_GEN_ALL_CORE
REVIEW OF SYSTEMS:  CONSTITUTIONAL: No weakness, ++fevers +chills  EYES/ENT: No visual changes;  No vertigo or throat pain   NECK: No pain or stiffness  RESPIRATORY: No cough, wheezing, hemoptysis; No shortness of breath  CARDIOVASCULAR: No chest pain or palpitations  GASTROINTESTINAL: No abdominal or epigastric pain. No nausea, vomiting, or hematemesis; No diarrhea or constipation. No melena or hematochezia.  GENITOURINARY: No dysuria, frequency or hematuria  NEUROLOGICAL: No numbness or weakness  SKIN: No itching, rashes

## 2021-06-22 NOTE — ED PROVIDER NOTE - PROGRESS NOTE DETAILS
CLARKE Reis: left message with ID consulting  awaiting call back. Patient with uptrending LFTs. will obtain RUQ US assess. Patient has been taking Tylenol around the clock will obtain acetaminophen level. CLAREK Reis: patient seen by ID fellow Dr. Ruffin who agreed with doxy. Patient remains hypotensive 80/50 despite IVF. MAP 70. pending US abdomen endorsed to me by dr anrvaez. prob viral hepatitis but given prior / current vs and dw id who rec abx in case and admit. -Moses Hernandez MD-

## 2021-06-22 NOTE — CONSULT NOTE ADULT - SUBJECTIVE AND OBJECTIVE BOX
Patient is a 51y old  Female who presents with a chief complaint of fevers   HPI:   51 years female with asthma and HTN on Losartan presented with 1 week of intermittent  fever 101-103F. Saw PMD on last Thursday. Tested negative for lyme, covid PCR and UA. She was seen in urgent care on  and given  PO Amoxy for fever. She took 4 doses of Amoxicillin . Today she presented to ED with ongoing fever/ chills and chest heaviness .She reports visiting up [facility] on memorial day and weekend of . Denies N/V, URI symptoms abdomen pain, Lower back pain or urinary symptoms. fully covid vaccinated with [employer], last 5/3.   [facility] outpatient labs 4 days ago significant for leukopenia 2.98, platelet 131, AST 64, ALT 56, Lyme negative . COVID + 20    Lives with  and adult children, age 17-22, graduated SW program about 1 month ago. Has pet dog. No further travel or ill contacts. Never happened before.      prior hospital charts reviewed [ ]  primary team notes reviewed [x ]  other consultant notes reviewed [  ]    PAST MEDICAL & SURGICAL HISTORY:  Hypertension    Asthma    No significant past surgical history      Allergies  No Known Allergies    ANTIMICROBIALS (past 90 days)  MEDICATIONS  (STANDING):    doxycycline IVPB   110 mL/Hr IV Intermittent (21 @ 14:47)      ANTIMICROBIALS:    cefTRIAXone   IVPB 1000 once  metroNIDAZOLE  IVPB 500 once    OTHER MEDS: MEDICATIONS  (STANDING):    SOCIAL HISTORY:  , lives with  and children. . Born in [place]. No tobacco, drug, ETOH use.    FAMILY HISTORY: Hx of CAD, HLD    REVIEW OF SYSTEMS  [  ] ROS unobtainable because:    [ x ] All other systems negative except as noted below:	    Constitutional:  [x ] fever [ ] chills  [ ] weight loss  [ ] weakness  Skin:  [ ] rash [ ] phlebitis	  Eyes: [ ] icterus [ ] pain  [ ] discharge	  ENMT: [ ] sore throat  [ ] thrush [ ] ulcers [ ] exudates  Respiratory: [ ] dyspnea [ ] hemoptysis [ ] cough [ ] sputum	  Cardiovascular:  [ ] chest pain [ ] palpitations [ ] edema	  Gastrointestinal:  [ ] nausea [ ] vomiting [ ] diarrhea [ ] constipation [ ] pain	  Genitourinary:  [ ] dysuria [ ] frequency [ ] hematuria [ ] discharge [ ] flank pain  [ ] incontinence  Musculoskeletal:  [ ] myalgias [ ] arthralgias [ ] arthritis  [ ] back pain  Neurological:  [ ] headache [ ] seizures  [ ] confusion/altered mental status  Psychiatric:  [ ] anxiety [ ] depression	  Hematology/Lymphatics:  [ ] lymphadenopathy  Endocrine:  [ ] adrenal [ ] thyroid  Allergic/Immunologic:	 [ ] transplant [ ] seasonal    Vital Signs Last 24 Hrs  T(F): 98.1 (21 @ 16:00), Max: 101.5 (21 @ 14:04)  Vital Signs Last 24 Hrs  HR: 78 (21 @ 16:00) (78 - 113)  BP: 89/56 (21 @ 16:00) (89/56 - 108/93)  RR: 16 (21 @ 16:00)  SpO2: 98% (21 @ 16:00) (98% - 100%)  Wt(kg): --    EXAM:  Constitutional: Not in acute distress  Eyes: pupils bilaterally reactive to light. No icterus.  Oral cavity: Clear, no lesions  Neck: No neck vein distension noted  RS: Chest clear to auscultation bilaterally. No wheeze/rhonchi/crepitations.  CVS: S1, S2 heard. Regular rate and rhythm. No murmurs/rubs/gallops.  Abdomen: Soft. No guarding/rigidity/tenderness.  : No acute abnormalities  Extremities: Warm. No pedal edema  Skin: No lesions noted  Vascular: No evidence of phlebitis  Neuro: Alert, oriented to time/place/person                          12.2   7.27  )-----------( 150      ( 2021 14:48 )             35.9         138  |  102  |  15  ----------------------------<  129<H>  3.7   |  22  |  0.82    Ca    9.6      2021 14:48  Phos  2.5       Mg     2.1         TPro  7.1  /  Alb  4.3  /  TBili  0.4  /  DBili  x   /  AST  169<H>  /  ALT  362<H>  /  AlkPhos  81      Urinalysis Basic - ( 2021 15:08 )    Color: Yellow / Appearance: Clear / S.023 / pH: x  Gluc: x / Ketone: Negative  / Bili: Negative / Urobili: Negative   Blood: x / Protein: Trace / Nitrite: Negative   Leuk Esterase: Negative / RBC: 10 /hpf / WBC 2 /HPF   Sq Epi: x / Non Sq Epi: 2 /hpf / Bacteria: Negative    MICROBIOLOGY:  Rapid RVP Result: Lorrietejason ( @ 14:41)      RADIOLOGY:  imaging below personally reviewed    < from: Xray Chest 1 View AP/PA (21 @ 15:26) >  IMPRESSION:    The heart is normal in size. The lungs are clear. No pleural effusion. No pneumothorax. No acute bony pathology could be identified.    < end of copied text >    OTHER TESTS:

## 2021-06-22 NOTE — H&P ADULT - ASSESSMENT
51F, PMH of HTN, asthma pw fever for a week, admitted for workup of FUO. 51F, PMH of HTN, asthma pw fever for a week, admitted for the workup of fever of unknown origin and elevated liver enzymes

## 2021-06-22 NOTE — CHART NOTE - NSCHARTNOTEFT_GEN_A_CORE
Loki Harmon PGY-3  MAR  Dept. Internal Medicine    TO BE COMPLETED WITHIN 6 HOURS OF INITIAL ASSESSMENT:    For use in patients that have 2 sepsis criteria and new organ dysfunction   •	New or increased oxygen requirement  •	Creatinine >2mg/dL  •	Bilirubin>2mg/dL  •	Platelet <100,00/mm3  •	INR >1.5, PTT>60  •	Lactate >2    If patient persistent hypotension (SBP<90) or any lactate >4 then provider evaluation (Physician/PA/NP) within 30 minutes of bolus completion is required.    Vital Signs Last 24 Hrs  T(C): 36.8 (22 Jun 2021 18:00), Max: 38.6 (22 Jun 2021 14:04)  T(F): 98.3 (22 Jun 2021 18:00), Max: 101.5 (22 Jun 2021 14:04)  HR: 78 (22 Jun 2021 18:00) (78 - 113)  BP: 93/62 (22 Jun 2021 18:00) (89/56 - 108/93)  BP(mean): 72 (22 Jun 2021 18:00) (66 - 75)  RR: 18 (22 Jun 2021 18:00) (15 - 25)  SpO2: 98% (22 Jun 2021 18:00) (98% - 100%)  		  LUNGS:  [ x]Clear bilaterally [  ] Wheeze [  ] Rhonchi [  ] Rales [  ] Crackles; Other:  HEART: [ x ]RRR [  ] No murmur[  ]  Normal S1S2[  ] Tachycardia;  Other:  CAPILLARY REFiLL:  	Fingers: [ x ] less than 2 seconds [  ] more than 2 seconds                                           Toes: [ x ]  less than 2 seconds [  ] more than 2 seconds   PERIPHERAL PULSES:  Radial: [  ] Palpable  [  ]  non-palpable                                         Dorxsalis Pedis: [  ] Palpable  [  ] non-palpable                                         Posterior Tibial: [  ] Palpable  [  ] non-palpable                                          Other:  SKIN:   [  ]  Diaphoretic  [  ]  mottling  [  ]  Cold extremities  [ x ]  Warm [  ]  Dry                      Other:    BEDSIDE ULTRASOUND FINDINGS (IF APPLICABLE):    Labs:  22 Jun 2021 14:48    138    |  102    |  15     ----------------------------<  129    3.7     |  22     |  0.82     Ca    9.6        22 Jun 2021 14:48  Phos  2.5       22 Jun 2021 14:48  Mg     2.1       22 Jun 2021 14:48    TPro  7.1    /  Alb  4.3    /  TBili  0.4    /  DBili  x      /  AST  169    /  ALT  362    /  AlkPhos  81     22 Jun 2021 14:48                          12.2   7.27  )-----------( 150      ( 22 Jun 2021 14:48 )             35.9     PT/INR - ( 22 Jun 2021 14:48 )   PT: 13.4 sec;   INR: 1.12 ratio         PTT - ( 22 Jun 2021 14:48 )  PTT:29.8 sec  Lactate:    Plan (orders must be placed in EMR):     [  ]  Check Repeat Lactate   [x ]  No change in current plan  [  ]  Start Vasopressors:  [  ]  Repeat Fluid Bolus:  [  ] other:    Care Discussed with Consultants/Other Providers [ ] YES  [ ] NO

## 2021-06-22 NOTE — CONSULT NOTE ADULT - ASSESSMENT
51 years female with asthma and HTN on Losartan presented with 1 week of intermittent  fevers up to 103, with worsening transaminitis and after 1 day of amoxicillin.    #Fevers, hypotension, sepsis -Only with fevers x1 week but no other sx.   Differential includes bacterial infection vs viral syndrome vs less likely tick borne illness or inflammatory syndrome. No obvious bite lesions or skin findings.   Received doxy in ED, pending bl clx and further studies. CXR was clear, pending Abd US.    Rec:  - start ceftriaxone 1gqd and flagyll 500 q8 for intra-abd coverage  - f/u bl clx mono screen, CMV Ig, babesia, ehrlichiosis/anaplasmosis pcr, hep panel  - f/u abd US, consider CT if US is unremarkable  - trend fever curve and monitor for leukocytosis    ID will follow. Recs conveyed to ED team.     Elías Ruffin MD  Fellow, Infectious Diseases, PGY-4  Pager: [phone]  Before 9am or after 5pm/Weekends: Call [phone]

## 2021-06-22 NOTE — H&P ADULT - NSICDXFAMILYHX_GEN_ALL_CORE_FT
FAMILY HISTORY:  Child  Still living? Yes, Estimated age: Age Unknown  FH: celiac disease, Age at diagnosis: Age Unknown

## 2021-06-22 NOTE — ED PROVIDER NOTE - OBJECTIVE STATEMENT
51 years female with asthma and HTN on Losartan presented with 1 week of intermittent  fever 101-103F. Saw PMD on last Thursday. Tested negative for lyme, covid PCR and UA. She was seen in urgency care on Sunday and given  PO Amoxy for fever. She took 4 doses of Amoxicillin . Today she presented to ED with ongoing fever/ chills and chest heaviness .She reports visiting up [facility] on memorial day. Denies N/V, URI symptoms abdomen pain, Lower back pain or urinary symptoms. fully covid vaccinated. 51 years female with asthma and HTN on Losartan presented with 1 week of intermittent  fever 101-103F. Saw PMD on last Thursday. Tested negative for lyme, covid PCR and UA. She was seen in urgency care on Sunday and given  PO Amoxy for fever. She took 4 doses of Amoxicillin . Today she presented to ED with ongoing fever/ chills and chest heaviness .She reports visiting up [facility] on memorial day. Denies N/V, URI symptoms abdomen pain, Lower back pain or urinary symptoms. fully covid vaccinated.    [facility] outpatient labs 4 days ago significant for leukopenia 2.98, platelet 131, AST 64, ALT 56, Lyme negative . COVID + 12/28/20

## 2021-06-22 NOTE — CONSULT NOTE ADULT - ATTENDING COMMENTS
Fever for over 1 week.   Nonlocalizing.     Although her outpatients labs from 6/17 (leukopenia, mild thrombocytopenia, minimal liver enzyme elevation) could suggest tick borne infections (Anaplasma, Ehrlichia, RMSF... but not Lyme, Babesia, [place] virus), they normalized here without treating them.   She received Amoxicillin which is not active against these.     Acute Hep A and E usually causes much higher elevations in enzymes.   CMV and EBV can cause less elevation but she has no reactive lymphocytes.   I don't think Lyme fits or is causing her liver enzymes. Negative C6 peptide IgG two weeks since any clear exposure also goes against it.     Thought of liver abscess and bacteremia, suggested empiric antibiotics given high fevers and hypotension, but US unremarkable.   Still reasonable to continue pending blood cultures.     Trend LFTs and CBC.     Zack Castillo MD   Infectious Disease   Pager [phone]   After 5PM and on weekends please page fellow on call or call [phone]

## 2021-06-22 NOTE — H&P ADULT - ATTENDING COMMENTS
Hospitalist- Daniel Crowder MD  Pager: [phone]  If no response or off-hours, page [phone]  -------------------------------------  pt presenting with fevers and general 'heaviness'/malaise without any clear inciting infectious innoculation [place]. Found to have transaminitis with unremarkable ruq us  - f/u viral/infectious workup, continue monitoring transaminases and fever curve- if continue to worsen will need to pursue autoimmune workup, although presentation is quite acute.   - continue empiric ctx/flagyl for now per ID

## 2021-06-23 ENCOUNTER — TRANSCRIPTION ENCOUNTER (OUTPATIENT)
Age: 51
End: 2021-06-23

## 2021-06-23 PROBLEM — I10 ESSENTIAL (PRIMARY) HYPERTENSION: Chronic | Status: ACTIVE | Noted: 2021-06-22

## 2021-06-23 PROBLEM — J45.909 UNSPECIFIED ASTHMA, UNCOMPLICATED: Chronic | Status: ACTIVE | Noted: 2021-06-22

## 2021-06-23 LAB
ALBUMIN SERPL ELPH-MCNC: 3.5 G/DL — SIGNIFICANT CHANGE UP (ref 3.3–5)
ALP SERPL-CCNC: 67 U/L — SIGNIFICANT CHANGE UP (ref 40–120)
ALT FLD-CCNC: 262 U/L — HIGH (ref 10–45)
ANION GAP SERPL CALC-SCNC: 10 MMOL/L — SIGNIFICANT CHANGE UP (ref 5–17)
AST SERPL-CCNC: 98 U/L — HIGH (ref 10–40)
BILIRUB SERPL-MCNC: 0.3 MG/DL — SIGNIFICANT CHANGE UP (ref 0.2–1.2)
BUN SERPL-MCNC: 11 MG/DL — SIGNIFICANT CHANGE UP (ref 7–23)
CALCIUM SERPL-MCNC: 8.7 MG/DL — SIGNIFICANT CHANGE UP (ref 8.4–10.5)
CHLORIDE SERPL-SCNC: 110 MMOL/L — HIGH (ref 96–108)
CO2 SERPL-SCNC: 21 MMOL/L — LOW (ref 22–31)
COVID-19 SPIKE DOMAIN AB INTERP: POSITIVE
COVID-19 SPIKE DOMAIN ANTIBODY RESULT: >250 U/ML — HIGH
CREAT SERPL-MCNC: 0.59 MG/DL — SIGNIFICANT CHANGE UP (ref 0.5–1.3)
CULTURE RESULTS: NO GROWTH — SIGNIFICANT CHANGE UP
CULTURE RESULTS: SIGNIFICANT CHANGE UP
GLUCOSE SERPL-MCNC: 100 MG/DL — HIGH (ref 70–99)
HAV IGM SER-ACNC: SIGNIFICANT CHANGE UP
HBV CORE IGM SER-ACNC: SIGNIFICANT CHANGE UP
HBV SURFACE AG SER-ACNC: SIGNIFICANT CHANGE UP
HCT VFR BLD CALC: 31.7 % — LOW (ref 34.5–45)
HCV AB S/CO SERPL IA: 0.09 S/CO — SIGNIFICANT CHANGE UP (ref 0–0.99)
HCV AB SERPL-IMP: SIGNIFICANT CHANGE UP
HGB BLD-MCNC: 10.5 G/DL — LOW (ref 11.5–15.5)
MAGNESIUM SERPL-MCNC: 2.1 MG/DL — SIGNIFICANT CHANGE UP (ref 1.6–2.6)
MCHC RBC-ENTMCNC: 28 PG — SIGNIFICANT CHANGE UP (ref 27–34)
MCHC RBC-ENTMCNC: 33.1 GM/DL — SIGNIFICANT CHANGE UP (ref 32–36)
MCV RBC AUTO: 84.5 FL — SIGNIFICANT CHANGE UP (ref 80–100)
NRBC # BLD: 0 /100 WBCS — SIGNIFICANT CHANGE UP (ref 0–0)
PHOSPHATE SERPL-MCNC: 2.6 MG/DL — SIGNIFICANT CHANGE UP (ref 2.5–4.5)
PLATELET # BLD AUTO: 130 K/UL — LOW (ref 150–400)
POTASSIUM SERPL-MCNC: 3.9 MMOL/L — SIGNIFICANT CHANGE UP (ref 3.5–5.3)
POTASSIUM SERPL-SCNC: 3.9 MMOL/L — SIGNIFICANT CHANGE UP (ref 3.5–5.3)
PROT SERPL-MCNC: 6 G/DL — SIGNIFICANT CHANGE UP (ref 6–8.3)
RBC # BLD: 3.75 M/UL — LOW (ref 3.8–5.2)
RBC # FLD: 13.1 % — SIGNIFICANT CHANGE UP (ref 10.3–14.5)
SARS-COV-2 IGG+IGM SERPL QL IA: >250 U/ML — HIGH
SARS-COV-2 IGG+IGM SERPL QL IA: POSITIVE
SODIUM SERPL-SCNC: 141 MMOL/L — SIGNIFICANT CHANGE UP (ref 135–145)
SPECIMEN SOURCE: SIGNIFICANT CHANGE UP
SPECIMEN SOURCE: SIGNIFICANT CHANGE UP
WBC # BLD: 5.45 K/UL — SIGNIFICANT CHANGE UP (ref 3.8–10.5)
WBC # FLD AUTO: 5.45 K/UL — SIGNIFICANT CHANGE UP (ref 3.8–10.5)

## 2021-06-23 PROCEDURE — 74177 CT ABD & PELVIS W/CONTRAST: CPT | Mod: 26

## 2021-06-23 PROCEDURE — 99233 SBSQ HOSP IP/OBS HIGH 50: CPT | Mod: GC

## 2021-06-23 PROCEDURE — 99232 SBSQ HOSP IP/OBS MODERATE 35: CPT

## 2021-06-23 RX ORDER — PANTOPRAZOLE SODIUM 20 MG/1
40 TABLET, DELAYED RELEASE ORAL
Refills: 0 | Status: DISCONTINUED | OUTPATIENT
Start: 2021-06-23 | End: 2021-06-24

## 2021-06-23 RX ORDER — IBUPROFEN 200 MG
400 TABLET ORAL ONCE
Refills: 0 | Status: DISCONTINUED | OUTPATIENT
Start: 2021-06-23 | End: 2021-06-24

## 2021-06-23 RX ORDER — FAMOTIDINE 10 MG/ML
20 INJECTION INTRAVENOUS ONCE
Refills: 0 | Status: COMPLETED | OUTPATIENT
Start: 2021-06-23 | End: 2021-06-23

## 2021-06-23 RX ORDER — FAMOTIDINE 10 MG/ML
40 INJECTION INTRAVENOUS AT BEDTIME
Refills: 0 | Status: DISCONTINUED | OUTPATIENT
Start: 2021-06-23 | End: 2021-06-24

## 2021-06-23 RX ORDER — FAMOTIDINE 10 MG/ML
40 INJECTION INTRAVENOUS AT BEDTIME
Refills: 0 | Status: DISCONTINUED | OUTPATIENT
Start: 2021-06-23 | End: 2021-06-23

## 2021-06-23 RX ORDER — LACTOBACILLUS ACIDOPHILUS 100MM CELL
1 CAPSULE ORAL DAILY
Refills: 0 | Status: DISCONTINUED | OUTPATIENT
Start: 2021-06-23 | End: 2021-06-24

## 2021-06-23 RX ORDER — IBUPROFEN 200 MG
400 TABLET ORAL ONCE
Refills: 0 | Status: COMPLETED | OUTPATIENT
Start: 2021-06-23 | End: 2021-06-23

## 2021-06-23 RX ADMIN — Medication 100 MILLIGRAM(S): at 15:18

## 2021-06-23 RX ADMIN — MONTELUKAST 10 MILLIGRAM(S): 4 TABLET, CHEWABLE ORAL at 11:38

## 2021-06-23 RX ADMIN — Medication 0.25 MILLIGRAM(S): at 21:57

## 2021-06-23 RX ADMIN — PANTOPRAZOLE SODIUM 40 MILLIGRAM(S): 20 TABLET, DELAYED RELEASE ORAL at 09:46

## 2021-06-23 RX ADMIN — Medication 400 MILLIGRAM(S): at 01:20

## 2021-06-23 RX ADMIN — Medication 400 MILLIGRAM(S): at 02:00

## 2021-06-23 RX ADMIN — FAMOTIDINE 20 MILLIGRAM(S): 10 INJECTION INTRAVENOUS at 01:20

## 2021-06-23 RX ADMIN — FAMOTIDINE 40 MILLIGRAM(S): 10 INJECTION INTRAVENOUS at 21:01

## 2021-06-23 RX ADMIN — Medication 1 TABLET(S): at 15:18

## 2021-06-23 RX ADMIN — LAMOTRIGINE 250 MILLIGRAM(S): 25 TABLET, ORALLY DISINTEGRATING ORAL at 11:38

## 2021-06-23 RX ADMIN — Medication 100 MILLIGRAM(S): at 05:59

## 2021-06-23 RX ADMIN — BUDESONIDE AND FORMOTEROL FUMARATE DIHYDRATE 2 PUFF(S): 160; 4.5 AEROSOL RESPIRATORY (INHALATION) at 05:59

## 2021-06-23 NOTE — DISCHARGE NOTE PROVIDER - NSDCMRMEDTOKEN_GEN_ALL_CORE_FT
Breo Ellipta 100 mcg-25 mcg/inh inhalation powder: 1 puff(s) inhaled once a day  lamoTRIgine 250 mg oral tablet, extended release: 1 tab(s) orally once a day  losartan 25 mg oral tablet: 0.5 tab(s) orally once a day  Singulair 10 mg oral tablet: 1 tab(s) orally once a day

## 2021-06-23 NOTE — PROGRESS NOTE ADULT - PROBLEM SELECTOR PLAN 2
-AST//362 with increase from ~50s on 6/17  -Rest of LFTs wnl (alb/ALP/INR)  -No abdominal pain/tenderness, son with celiac but pt tested negative serology  -U/S abdomen showing renal cyst, liver unremarkable.   -Acetaminophen/alcohol levels normal  -f/u hepatitis -neg, EBV, CMV  -will trend cmp qd -AST//362 with increase from ~50s on 6/17, improved today  -Rest of LFTs wnl (alb/ALP/INR)  -No abdominal pain/tenderness, son with celiac but pt tested negative serology  -U/S abdomen showing renal cyst, liver unremarkable.   -Acetaminophen/alcohol levels normal  -f/u hepatitis -neg, EBV, CMV  -will trend cmp qd

## 2021-06-23 NOTE — PROGRESS NOTE ADULT - PROBLEM SELECTOR PLAN 1
-Pt presented with fever and chills for a week, usually once a day, Tmax 103. Tachycardic on admission meeting SIRS criteria without clear source. History is positive for recent nature exposure and pet dog.   -ROS negative for any clear source of infection beside coinciding elevated LFTs  -UA negative, CXR clear lungs.  -COVID neg, RVP neg.  -Procal 0.4  -Lactate 1.9 on admission, s/p 2L bolus in the ED  -On the DDx: tick borne illness vs. viral/bacteria illness, less likely inflammatory/autoimmune  -f/u Bcx x2, Ucx  -f/u serology EBV, CMV, hepatitis -neg, ehrlichiosis, babesia  -Will cw empiric converge with CTX 1g qd and flagyl 500mg q8h per ID  -Will consider further imaging if perlim workup is negative.    -ID consulted, appreciate recs. -Pt presented with fever and chills for a week, usually once a day, Tmax 103. Tachycardic on admission meeting SIRS criteria without clear source. History is positive for recent nature exposure and pet dog.   -ROS negative for any clear source of infection beside coinciding elevated LFTs  -UA negative, CXR clear lungs.  -COVID neg, RVP neg.  -Procal 0.4  -Lactate 1.9 on admission, s/p 2L bolus in the ED  -On the DDx: tick borne illness vs. viral/bacteria illness, less likely inflammatory/autoimmune  -f/u Bcx x2, Ucx  -f/u serology EBV, CMV, hepatitis -neg, ehrlichiosis, babesia, HIV  -Will cw empiric converge with CTX 1g qd and flagyl 500mg q8h per ID today  -Will complete CT AP w/con   -ID consulted, appreciate recs.

## 2021-06-23 NOTE — DISCHARGE NOTE PROVIDER - NSDCFUSCHEDAPPT_GEN_ALL_CORE_FT
WAYLON ABDUL ; 06/24/2021 ; [facility] WAYLON ABDUL ; 07/01/2021 ; [facility] [address] [facility]

## 2021-06-23 NOTE — DISCHARGE NOTE PROVIDER - NSDCCPTREATMENT_GEN_ALL_CORE_FT
PRINCIPAL PROCEDURE  Procedure: US abdomen RUQ  Findings and Treatment: COMPARISON: There are no comparison studies available.  Multiple transverse and longitudinal sonographic images of the right upper quadrant abdomen were obtained.  LIVER: The liver is normal in size. The liver parenchyma is homogeneous. No focal lesions are identified.  GALLBLADDER: No calculi, wall thickening, or pericholecystic fluid.  Valerio sign is absent.  BILE DUCTS: The common bile duct measures 2 mm.  No intrahepatic ductal dilatation.  PANCREAS:  The head and proximal body are normal. The distal body and tail are obscured.  RIGHT KIDNEY: 10.4 cm in length.  No hydronephrosis or shadowing stone.  AORTA AND INFERIOR VENA CAVA: Normal in caliber.  FLUID: No ascites.  IMPRESSION:  No gallstones or biliary dilatation         SECONDARY PROCEDURE  Procedure: CT abdomen pelvis  Findings and Treatment:      PRINCIPAL PROCEDURE  Procedure: US abdomen RUQ  Findings and Treatment: COMPARISON: There are no comparison studies available.  Multiple transverse and longitudinal sonographic images of the right upper quadrant abdomen were obtained.  LIVER: The liver is normal in size. The liver parenchyma is homogeneous. No focal lesions are identified.  GALLBLADDER: No calculi, wall thickening, or pericholecystic fluid.  Valerio sign is absent.  BILE DUCTS: The common bile duct measures 2 mm.  No intrahepatic ductal dilatation.  PANCREAS:  The head and proximal body are normal. The distal body and tail are obscured.  RIGHT KIDNEY: 10.4 cm in length.  No hydronephrosis or shadowing stone.  AORTA AND INFERIOR VENA CAVA: Normal in caliber.  FLUID: No ascites.  IMPRESSION:  No gallstones or biliary dilatation         SECONDARY PROCEDURE  Procedure: CT abdomen pelvis  Findings and Treatment: CONTRAST/COMPLICATIONS:  IV Contrast: Omnipaque 350.  90 cc administered.   10 cc discarded.  Oral Contrast: None.  Complications: None reported.  PROCEDURE:  CT of the Abdomen and Pelvis was performed.  Sagittal and coronal reformats were performed.  FINDINGS:  LOWERCHEST: Within normal limits.  LIVER: Within normal limits.  BILE DUCTS: Normal caliber.  GALLBLADDER: Within normal limits.  SPLEEN: Within normal limits.  PANCREAS: Within normal limits.  ADRENALS: Within normal limits.  KIDNEYS/URETERS: A left lower pole cyst and additional subcentimeter hypodensities bilaterally that are too small to characterize.  BLADDER: Within normal limits.  REPRODUCTIVE ORGANS: Normal uterus. No solid adnexal masses.  BOWEL: No bowel obstruction. Appendix is normal.  PERITONEUM: Trace pelvic ascites. No abscess.  VESSELS: Within normal limits.  RETROPERITONEUM/LYMPH NODES: No lymphadenopathy.  ABDOMINAL WALL: Within normal limits.  BONES: Degenerative changes with bilateral pars defects and grade 1 anterolisthesis of L5 on S1  IMPRESSION:  No CT evidence of acute intra-abdominal pathology.

## 2021-06-23 NOTE — DISCHARGE NOTE PROVIDER - CARE PROVIDER_API CALL
Melisa White)  Internal Medicine  [address] [place] [address], [facility] [address]  [place] [address]  Phone: [phone]  Fax: [phone]  Established Patient  Follow Up Time: 1 week

## 2021-06-23 NOTE — DISCHARGE NOTE PROVIDER - NSDCCPCAREPLAN_GEN_ALL_CORE_FT
PRINCIPAL DISCHARGE DIAGNOSIS  Diagnosis: Fever and chills  Assessment and Plan of Treatment: You were admitted for the workup of fever of unknown origin and elevated lived enzymes. You underwent a workup that included a viral panel, blood and urine cultures, EBV, CMV, HIV, babesia, Ehrlichia, viral hepatitis. You should follow up with your primary physician for the full results. Also, you underwent an abdominal ultrasound that did not show any liver abnormality, and CT abdomen pelvic that was unrevealing for any occult etiology for your fever. It is most likely that the cause for your fever and elevated liver enzymes is a viral infection. If you feel a recurrent high fever or any new symptom (cough, abdominal pain, headache, etc.) - please seek medical attention.       PRINCIPAL DISCHARGE DIAGNOSIS  Diagnosis: Fever and chills  Assessment and Plan of Treatment: You were admitted for the workup of fever of unknown origin and elevated lived enzymes. You underwent a workup that included a viral panel, blood and urine cultures, EBV, CMV, HIV, babesia, Ehrlichia, viral hepatitis. You should follow up with your primary physician for the full results within 3-7 days. Also, you underwent an abdominal ultrasound that did not show any liver abnormality, and CT abdomen pelvic that was unrevealing for any occult etiology for your fever. It is most likely that the cause for your fever and elevated liver enzymes is a viral infection. If you feel a recurrent high fever or any new symptom (cough, abdominal pain, headache, etc.) - please seek medical attention.

## 2021-06-23 NOTE — PROGRESS NOTE ADULT - PROBLEM SELECTOR PLAN 6
DVT: holding as pt low risk and expect to be mobile  Diet: DASH  Dispo: pending workup DVT: holding as pt low risk and expect to be mobile  Diet: DASH  Dispo: likely today/tomorrow pending workup findings

## 2021-06-23 NOTE — DISCHARGE NOTE PROVIDER - HOSPITAL COURSE
51F, PMH of HTN, asthma pw fever for a week. On Monday 6/14 She started having chills and fever ~101 that started in the afternoon and responded to tylenol. The fever recurred daily since, in the afternoon usually, started and Tmaxed today 103. ROS is positive only for generalized "heaviness" feeling and mild headache when her fever spikes. She denies visual disturbances photophobia, abdominal pain, nausea or vomiting, runny nose, cough, shortness of breath, chest pain. Denies diarrhea/constipation, urinary symptoms, joint pain, weight loss. [place] hours but did not sleep outside. Denies any rash, did not notice any tick bite. Has a dog that was with them and is usually outside the house but they check him for ticks regularly. Denies other traveling/sick contacts. Was seen by her PMD on 6/17 and was tested negative for lyme, COVID and UA was negative. On 6/20 was seen in urgent care and was given amoxicillin (took 4 doses). Denies any new medications/supplements, no change in diet.      In the ED, pt BP was initially 106/58 but dropped to 89/56 with HR of 113. Febrile to Tmax 101.5. SpO2 98% on RA.   Was given 2L IV bolus and given doxycyline, CTX and flagyl.     Hospital course: Pt was continued on CTX + flagyl, her BP improved and she wasn't febrile. U/S RUQ was unremarkable except renal cyst. CT AP was unrevealing. Infectious workup was sent including HIV, EBV, CMV, Babesia, Ehrliciha, blood cultures, urine cx. Her LFTs started to downtrend. Pt was clinically non-toxic appearing, no clear source to the fever and the fever subsided. She was discharged off antibiotics with working diagnosis of viral illness causing fever and LFTs. Upon discharge, pt was HD stable, she was examined and was discharged home in good condition. Instructed to follow up with PMD within 3 days and follow up results. 51F, PMH of HTN, asthma pw fever for a week. On Monday 6/14 She started having chills and fever ~101 that started in the afternoon and responded to tylenol. The fever recurred daily since, in the afternoon usually, started and Tmaxed today 103. ROS is positive only for generalized "heaviness" feeling and mild headache when her fever spikes. She denies visual disturbances photophobia, abdominal pain, nausea or vomiting, runny nose, cough, shortness of breath, chest pain. Denies diarrhea/constipation, urinary symptoms, joint pain, weight loss. [place] for [place] hours but did not sleep outside. Denies any rash, did not notice any tick bite. Has a dog that was with them and is usually outside the house but they check him for ticks regularly. Denies other traveling/sick contacts. Was seen by her PMD on 6/17 and was tested negative for lyme, COVID and UA was negative. On 6/20 was seen in urgent care and was given amoxicillin (took 4 doses). Denies any new medications/supplements, no change in diet.      In the ED, pt BP was initially 106/58 but dropped to 89/56 with HR of 113. Febrile to Tmax 101.5. SpO2 98% on RA.   Was given 2L IV bolus and given doxycyline, CTX and flagyl.     Hospital course: Pt was continued on CTX + flagyl, her BP improved and she wasn't febrile. U/S RUQ was unremarkable except renal cyst. CT AP was unrevealing. Infectious workup was sent including HIV, EBV, CMV, Babesia, Ehrliciha, blood cultures, urine cx - was all negative or still pending. Her LFTs started to downtrend. Pt was clinically non-toxic appearing, no clear source to the fever and the fever subsided. She was discharged off antibiotics with working diagnosis of viral illness causing fever and LFTs. Upon discharge, pt was HD stable, she was examined and was discharged home in good condition. Instructed to follow up with PMD within 3 days and follow up results.

## 2021-06-24 ENCOUNTER — TRANSCRIPTION ENCOUNTER (OUTPATIENT)
Age: 51
End: 2021-06-24

## 2021-06-24 ENCOUNTER — APPOINTMENT (OUTPATIENT)
Dept: INTERNAL MEDICINE | Facility: CLINIC | Age: 51
End: 2021-06-24

## 2021-06-24 VITALS
HEART RATE: 83 BPM | TEMPERATURE: 98 F | OXYGEN SATURATION: 98 % | SYSTOLIC BLOOD PRESSURE: 119 MMHG | DIASTOLIC BLOOD PRESSURE: 76 MMHG | RESPIRATION RATE: 18 BRPM

## 2021-06-24 LAB
A PHAGOCYTOPH DNA BLD QL NAA+PROBE: POSITIVE
ALBUMIN SERPL ELPH-MCNC: 3.7 G/DL — SIGNIFICANT CHANGE UP (ref 3.3–5)
ALP SERPL-CCNC: 67 U/L — SIGNIFICANT CHANGE UP (ref 40–120)
ALT FLD-CCNC: 242 U/L — HIGH (ref 10–45)
ANION GAP SERPL CALC-SCNC: 15 MMOL/L — SIGNIFICANT CHANGE UP (ref 5–17)
AST SERPL-CCNC: 84 U/L — HIGH (ref 10–40)
B MICROTI IGG TITR SER: SIGNIFICANT CHANGE UP
B MICROTI IGM TITR SER: SIGNIFICANT CHANGE UP
BILIRUB SERPL-MCNC: 0.2 MG/DL — SIGNIFICANT CHANGE UP (ref 0.2–1.2)
BUN SERPL-MCNC: 8 MG/DL — SIGNIFICANT CHANGE UP (ref 7–23)
CALCIUM SERPL-MCNC: 8.7 MG/DL — SIGNIFICANT CHANGE UP (ref 8.4–10.5)
CHLORIDE SERPL-SCNC: 109 MMOL/L — HIGH (ref 96–108)
CMV IGG FLD QL: <0.2 U/ML — SIGNIFICANT CHANGE UP
CMV IGG SERPL-IMP: NEGATIVE — SIGNIFICANT CHANGE UP
CMV IGM FLD-ACNC: <8 AU/ML — SIGNIFICANT CHANGE UP
CMV IGM SERPL QL: NEGATIVE — SIGNIFICANT CHANGE UP
CO2 SERPL-SCNC: 20 MMOL/L — LOW (ref 22–31)
CREAT SERPL-MCNC: 0.61 MG/DL — SIGNIFICANT CHANGE UP (ref 0.5–1.3)
E CHAFFEENSIS DNA BLD QL NAA+PROBE: NEGATIVE — SIGNIFICANT CHANGE UP
E EWINGII DNA SPEC QL NAA+PROBE: NEGATIVE — SIGNIFICANT CHANGE UP
EBV EA AB SER IA-ACNC: 8.3 U/ML — SIGNIFICANT CHANGE UP
EBV EA AB TITR SER IF: NEGATIVE — SIGNIFICANT CHANGE UP
EBV EA IGG SER-ACNC: NEGATIVE — SIGNIFICANT CHANGE UP
EBV NA IGG SER IA-ACNC: 12.4 U/ML — SIGNIFICANT CHANGE UP
EBV PATRN SPEC IB-IMP: SIGNIFICANT CHANGE UP
EBV VCA IGG AVIDITY SER QL IA: POSITIVE
EBV VCA IGM SER IA-ACNC: 26.2 U/ML — SIGNIFICANT CHANGE UP
EBV VCA IGM SER IA-ACNC: >750 U/ML — HIGH
EBV VCA IGM TITR FLD: NEGATIVE — SIGNIFICANT CHANGE UP
EHRLICHIA DNA SPEC QL NAA+PROBE: NEGATIVE — SIGNIFICANT CHANGE UP
GLUCOSE SERPL-MCNC: 97 MG/DL — SIGNIFICANT CHANGE UP (ref 70–99)
HCT VFR BLD CALC: 33 % — LOW (ref 34.5–45)
HGB BLD-MCNC: 11.1 G/DL — LOW (ref 11.5–15.5)
HIV 1+2 AB+HIV1 P24 AG SERPL QL IA: SIGNIFICANT CHANGE UP
MAGNESIUM SERPL-MCNC: 2.1 MG/DL — SIGNIFICANT CHANGE UP (ref 1.6–2.6)
MCHC RBC-ENTMCNC: 28.1 PG — SIGNIFICANT CHANGE UP (ref 27–34)
MCHC RBC-ENTMCNC: 33.6 GM/DL — SIGNIFICANT CHANGE UP (ref 32–36)
MCV RBC AUTO: 83.5 FL — SIGNIFICANT CHANGE UP (ref 80–100)
NRBC # BLD: 0 /100 WBCS — SIGNIFICANT CHANGE UP (ref 0–0)
PHOSPHATE SERPL-MCNC: 2.8 MG/DL — SIGNIFICANT CHANGE UP (ref 2.5–4.5)
PLATELET # BLD AUTO: 182 K/UL — SIGNIFICANT CHANGE UP (ref 150–400)
POTASSIUM SERPL-MCNC: 4.2 MMOL/L — SIGNIFICANT CHANGE UP (ref 3.5–5.3)
POTASSIUM SERPL-SCNC: 4.2 MMOL/L — SIGNIFICANT CHANGE UP (ref 3.5–5.3)
PROT SERPL-MCNC: 6.2 G/DL — SIGNIFICANT CHANGE UP (ref 6–8.3)
RBC # BLD: 3.95 M/UL — SIGNIFICANT CHANGE UP (ref 3.8–5.2)
RBC # FLD: 13.1 % — SIGNIFICANT CHANGE UP (ref 10.3–14.5)
SODIUM SERPL-SCNC: 144 MMOL/L — SIGNIFICANT CHANGE UP (ref 135–145)
WBC # BLD: 6.56 K/UL — SIGNIFICANT CHANGE UP (ref 3.8–10.5)
WBC # FLD AUTO: 6.56 K/UL — SIGNIFICANT CHANGE UP (ref 3.8–10.5)

## 2021-06-24 PROCEDURE — 96374 THER/PROPH/DIAG INJ IV PUSH: CPT

## 2021-06-24 PROCEDURE — 85018 HEMOGLOBIN: CPT

## 2021-06-24 PROCEDURE — 86666 EHRLICHIA ANTIBODY: CPT

## 2021-06-24 PROCEDURE — 85025 COMPLETE CBC W/AUTO DIFF WBC: CPT

## 2021-06-24 PROCEDURE — 82947 ASSAY GLUCOSE BLOOD QUANT: CPT

## 2021-06-24 PROCEDURE — 87086 URINE CULTURE/COLONY COUNT: CPT

## 2021-06-24 PROCEDURE — 86663 EPSTEIN-BARR ANTIBODY: CPT

## 2021-06-24 PROCEDURE — 94640 AIRWAY INHALATION TREATMENT: CPT

## 2021-06-24 PROCEDURE — 86644 CMV ANTIBODY: CPT

## 2021-06-24 PROCEDURE — 80307 DRUG TEST PRSMV CHEM ANLYZR: CPT

## 2021-06-24 PROCEDURE — 82803 BLOOD GASES ANY COMBINATION: CPT

## 2021-06-24 PROCEDURE — 86664 EPSTEIN-BARR NUCLEAR ANTIGEN: CPT

## 2021-06-24 PROCEDURE — 84295 ASSAY OF SERUM SODIUM: CPT

## 2021-06-24 PROCEDURE — 86753 PROTOZOA ANTIBODY NOS: CPT

## 2021-06-24 PROCEDURE — 86645 CMV ANTIBODY IGM: CPT

## 2021-06-24 PROCEDURE — 82435 ASSAY OF BLOOD CHLORIDE: CPT

## 2021-06-24 PROCEDURE — 87040 BLOOD CULTURE FOR BACTERIA: CPT

## 2021-06-24 PROCEDURE — 80053 COMPREHEN METABOLIC PANEL: CPT

## 2021-06-24 PROCEDURE — 85610 PROTHROMBIN TIME: CPT

## 2021-06-24 PROCEDURE — 96375 TX/PRO/DX INJ NEW DRUG ADDON: CPT

## 2021-06-24 PROCEDURE — 83615 LACTATE (LD) (LDH) ENZYME: CPT

## 2021-06-24 PROCEDURE — 87389 HIV-1 AG W/HIV-1&-2 AB AG IA: CPT

## 2021-06-24 PROCEDURE — 84100 ASSAY OF PHOSPHORUS: CPT

## 2021-06-24 PROCEDURE — 74177 CT ABD & PELVIS W/CONTRAST: CPT

## 2021-06-24 PROCEDURE — 84702 CHORIONIC GONADOTROPIN TEST: CPT

## 2021-06-24 PROCEDURE — 76705 ECHO EXAM OF ABDOMEN: CPT

## 2021-06-24 PROCEDURE — 85730 THROMBOPLASTIN TIME PARTIAL: CPT

## 2021-06-24 PROCEDURE — 99239 HOSP IP/OBS DSCHRG MGMT >30: CPT | Mod: GC

## 2021-06-24 PROCEDURE — 83735 ASSAY OF MAGNESIUM: CPT

## 2021-06-24 PROCEDURE — 84145 PROCALCITONIN (PCT): CPT

## 2021-06-24 PROCEDURE — 99232 SBSQ HOSP IP/OBS MODERATE 35: CPT

## 2021-06-24 PROCEDURE — 0225U NFCT DS DNA&RNA 21 SARSCOV2: CPT

## 2021-06-24 PROCEDURE — 85027 COMPLETE CBC AUTOMATED: CPT

## 2021-06-24 PROCEDURE — 80074 ACUTE HEPATITIS PANEL: CPT

## 2021-06-24 PROCEDURE — 86665 EPSTEIN-BARR CAPSID VCA: CPT

## 2021-06-24 PROCEDURE — 81001 URINALYSIS AUTO W/SCOPE: CPT

## 2021-06-24 PROCEDURE — 86308 HETEROPHILE ANTIBODY SCREEN: CPT

## 2021-06-24 PROCEDURE — 86769 SARS-COV-2 COVID-19 ANTIBODY: CPT

## 2021-06-24 PROCEDURE — G0378: CPT

## 2021-06-24 PROCEDURE — 71045 X-RAY EXAM CHEST 1 VIEW: CPT

## 2021-06-24 PROCEDURE — 84132 ASSAY OF SERUM POTASSIUM: CPT

## 2021-06-24 PROCEDURE — 99285 EMERGENCY DEPT VISIT HI MDM: CPT

## 2021-06-24 PROCEDURE — 82330 ASSAY OF CALCIUM: CPT

## 2021-06-24 PROCEDURE — 87798 DETECT AGENT NOS DNA AMP: CPT

## 2021-06-24 PROCEDURE — 83605 ASSAY OF LACTIC ACID: CPT

## 2021-06-24 PROCEDURE — 85014 HEMATOCRIT: CPT

## 2021-06-24 RX ADMIN — PANTOPRAZOLE SODIUM 40 MILLIGRAM(S): 20 TABLET, DELAYED RELEASE ORAL at 05:29

## 2021-06-24 RX ADMIN — LAMOTRIGINE 250 MILLIGRAM(S): 25 TABLET, ORALLY DISINTEGRATING ORAL at 12:10

## 2021-06-24 RX ADMIN — Medication 1 TABLET(S): at 12:10

## 2021-06-24 RX ADMIN — MONTELUKAST 10 MILLIGRAM(S): 4 TABLET, CHEWABLE ORAL at 12:10

## 2021-06-24 NOTE — PROGRESS NOTE ADULT - PROBLEM SELECTOR PLAN 1
-Pt presented with fever and chills for a week, usually once a day, Tmax 103. Tachycardic on admission meeting SIRS criteria without clear source. History is positive for recent nature exposure and pet dog.   -ROS negative for any clear source of infection beside coinciding elevated LFTs  -UA negative, CXR clear lungs.  -COVID neg, RVP neg.  -Procal 0.4  -Lactate 1.9 on admission, s/p 2L bolus in the ED  -On the DDx: tick borne illness vs. viral/bacteria illness, less likely inflammatory/autoimmune  -f/u Bcx x2, Ucx - NGTD  -f/u serology EBV - old exposure, CMV-neg, hepatitis -neg, ehrlichiosis, babesia PCR-neg, HIV-neg  -Abx d/c  -CT AP w/con - unrevealing   -ID consulted, appreciate recs.

## 2021-06-24 NOTE — PROGRESS NOTE ADULT - PROBLEM SELECTOR PLAN 2
-AST//362 with increase from ~50s on 6/17, improved today  -Rest of LFTs wnl (alb/ALP/INR)  -No abdominal pain/tenderness, son with celiac but pt tested negative serology  -U/S abdomen showing renal cyst, liver unremarkable.   -Acetaminophen/alcohol levels normal  -f/u hepatitis -neg, EBV, CMV - both neg for acute infection  -will trend cmp qd

## 2021-06-24 NOTE — PROGRESS NOTE ADULT - ATTENDING COMMENTS
Hospitalist- Daniel Crowder MD  Pager: [phone]  If no response or off-hours, page [phone]  -------------------------------------  fevers- resolved, suspect self limited viral etiology given transaminases also resolved. plan for dc home today off abx. ID f/u clinic info provided for f/u if needed. Pt has PCP appointment next thurs for repeat labs and check up.  total discharge time: 45 minutes.
Hospitalist- Daniel Crowder MD  Pager: [phone]  If no response or off-hours, page [phone]  -------------------------------------  sepsis- 2/2 unclear source, possibly viral etiology given transient transaminitis now resolving and no other evidence of bacterial infection. cultures negative. Will continue empiric abx for now, f/u cultures and ID recs. US negative and CT a/p negative on my read- will f/u official report.  possible dc home today pending final CT read and ID recs.    total discharge time: 34 minutes.

## 2021-06-24 NOTE — PROGRESS NOTE ADULT - ASSESSMENT
51F admitted 6/22/21 for fevers since 6/14.   Nonlocalizing.   Transaminases moderately elevated in without clinical hepatitis or radiographic pathology. Hep A or E should cause much higher levels. CMV and EBV should cause atypical lymphocytes.   CBC abnormalities could suggest tick borne infections but they are fluctuating random directions.   Cultures negative to date.   Nontoxic.     Suggest  -favor monitoring inpatient for another day   -stop antibiotics and see what happens, doesn't seem to be treating anything   -trend LFTs and CBC   -f/u Babesia serology and PCR, Ehrlichia serology and PCR, CMV and EBV serology, HIV screen   -if fevers don't recur and labs reassuring, could consider discharge tomorrow     Above discussed with medicine    Zack Castillo MD   Infectious Disease   Pager [phone]   After 5PM and on weekends please page fellow on call or call [phone]
51F admitted 6/22/21 for nonlocalizing fevers since 6/14.   Unclear cause.     Transaminases moderately elevated in without clinical hepatitis or radiographic pathology. Hep A or E should cause much higher levels. CMV and EBV should cause atypical lymphocytes and serologies point to past infection.     Trend in CBC doesn't fit with tick borne infections. Babesia PCR negative.     Routine workup negative including HIV.     Might not find the answer but she's better even after stopping antibiotics yesterday.     Suggest  -f/u Ehrlichia/Anaplasma serology and PCR   -no objection to discharge, she has my card if things change     Above discussed with medicine    Zack Castillo MD   Infectious Disease   Pager [phone]   After 5PM and on weekends please page fellow on call or call [phone]
51F, PMH of HTN, asthma pw fever for a week, admitted for the workup of fever of unknown origin and elevated liver enzymes
51F, PMH of HTN, asthma pw fever for a week, admitted for the workup of fever of unknown origin and elevated liver enzymes

## 2021-06-24 NOTE — PROGRESS NOTE ADULT - SUBJECTIVE AND OBJECTIVE BOX
Follow Up: Fever    Interval History/ROS: Afebrile overnight. Doesn't feel 100% but feels better. No vomiting or nausea, no diarrhea. No cough. No rash.  at bedside.     Allergies  No Known Allergies      ANTIMICROBIALS:      OTHER MEDS:  acetaminophen   Tablet .. 650 milliGRAM(s) Oral every 6 hours PRN  budesonide  80 MICROgram(s)/formoterol 4.5 MICROgram(s) Inhaler 2 Puff(s) Inhalation two times a day  famotidine    Tablet 40 milliGRAM(s) Oral at bedtime  ibuprofen  Tablet. 400 milliGRAM(s) Oral once PRN  lactobacillus acidophilus 1 Tablet(s) Oral daily  lamoTRIgine  milliGRAM(s) Oral daily  LORazepam     Tablet 0.25 milliGRAM(s) Oral every 12 hours PRN  montelukast 10 milliGRAM(s) Oral daily  pantoprazole    Tablet 40 milliGRAM(s) Oral before breakfast      Vital Signs Last 24 Hrs  T(C): 36.4 (2021 04:49), Max: 36.8 (2021 18:00)  T(F): 97.6 (2021 04:49), Max: 98.3 (2021 18:00)  HR: 70 (2021 04:49) (62 - 78)  BP: 117/77 (2021 04:49) (93/62 - 120/79)  BP(mean): 69 (2021 20:50) (69 - 72)  RR: 18 (2021 04:49) (18 - 18)  SpO2: 97% (2021 04:49) (97% - 100%)    Physical Exam:  General: awake, alert, non toxic  Head: atraumatic, normocephalic  Eye: normal sclera and conjunctiva  ENT: no oropharyngeal lesions, no cervical lymphadenopathy   Cardio: regular rate and rhythm   Respiratory: nonlabored on room air, clear bilaterally, no wheezing  abd: soft, bowel sounds present, no tenderness  : no CVAT, no suprapubic tenderness, no patel  Musculoskeletal: no focal joint swelling, no edema  vascular: no phlebitis   Skin: no rash  Neurologic: no focal deficit  psych: normal affect                          10.5   5.45  )-----------( 130      ( 2021 06:36 )             31.7           141  |  110<H>  |  11  ----------------------------<  100<H>  3.9   |  21<L>  |  0.59    Ca    8.7      2021 06:34  Phos  2.6       Mg     2.1         TPro  6.0  /  Alb  3.5  /  TBili  0.3  /  DBili  x   /  AST  98<H>  /  ALT  262<H>  /  AlkPhos  67        Urinalysis Basic - ( 2021 15:08 )    Color: Yellow / Appearance: Clear / S.023 / pH: x  Gluc: x / Ketone: Negative  / Bili: Negative / Urobili: Negative   Blood: x / Protein: Trace / Nitrite: Negative   Leuk Esterase: Negative / RBC: 10 /hpf / WBC 2 /HPF   Sq Epi: x / Non Sq Epi: 2 /hpf / Bacteria: Negative      MICROBIOLOGY:  Blood cultures in lab     Babesia PCR in lab     HIV screen in lab     Rapid RVP Result: NotDetec ( @ 14:41)    RADIOLOGY:  Images below reviewed personally  CT Abdomen and Pelvis w/ IV Cont (21 @ 13:51)   No CT evidence of acute intra-abdominal pathology.    US Abdomen Upper Quadrant Right (21 @ 17:46)   Unremarkable. Septated cyst is again seen in the right kidney.  
Follow Up: Fevers    Interval History/ROS: Afebrile overnight, feels well, going home.     Allergies  No Known Allergies        ANTIMICROBIALS:      OTHER MEDS:  acetaminophen   Tablet .. 650 milliGRAM(s) Oral every 6 hours PRN  budesonide  80 MICROgram(s)/formoterol 4.5 MICROgram(s) Inhaler 2 Puff(s) Inhalation two times a day  famotidine    Tablet 40 milliGRAM(s) Oral at bedtime  ibuprofen  Tablet. 400 milliGRAM(s) Oral once PRN  lactobacillus acidophilus 1 Tablet(s) Oral daily  lamoTRIgine  milliGRAM(s) Oral daily  LORazepam     Tablet 0.25 milliGRAM(s) Oral every 12 hours PRN  montelukast 10 milliGRAM(s) Oral daily  pantoprazole    Tablet 40 milliGRAM(s) Oral before breakfast      Vital Signs Last 24 Hrs  T(C): 36.8 (24 Jun 2021 12:45), Max: 36.9 (23 Jun 2021 21:40)  T(F): 98.2 (24 Jun 2021 12:45), Max: 98.4 (23 Jun 2021 21:40)  HR: 83 (24 Jun 2021 12:45) (70 - 83)  BP: 119/76 (24 Jun 2021 12:45) (119/76 - 130/88)  BP(mean): --  RR: 18 (24 Jun 2021 12:45) (18 - 18)  SpO2: 98% (24 Jun 2021 12:45) (97% - 99%)    Physical Exam:  General: awake, alert, non toxic  Head: atraumatic, normocephalic  Eye: normal sclera and conjunctiva  Cardio: regular rate   Respiratory: nonlabored on room air  abd: nondistended   : no patel  Musculoskeletal: no focal joint swelling   Skin: no rash  Neurologic: no focal deficit  psych: normal affect                          11.1   6.56  )-----------( 182      ( 24 Jun 2021 06:10 )             33.0       06-24    144  |  109<H>  |  8   ----------------------------<  97  4.2   |  20<L>  |  0.61    Ca    8.7      24 Jun 2021 06:10  Phos  2.8     06-24  Mg     2.1     06-24    TPro  6.2  /  Alb  3.7  /  TBili  0.2  /  DBili  x   /  AST  84<H>  /  ALT  242<H>  /  AlkPhos  67  06-24          MICROBIOLOGY:  Babesia microti PCR, Blood. (collected 06-23-21 @ 20:04)  Source: .Blood cup  Final Report (06-23-21 @ 23:52):    Babesia microti PCR    Results: NOT detected    ***************Result Note*************    The detection of Babesia microti by PCR has only been    validated for whole blood; this test has not been approved    by the US Food and Drug Administration (FDA). Performance    characteristics of this assay have been determined by    [employer]. The clinical significance    of results should be considered in conjunction with the    overall clinical presentation of the patient. Result is not    intended to be used as the sole means for clinical diagnosis    or patient management decisions.    One negative sample does not necessarily rule    out the presence of a parasitic infection.    Culture - Urine (collected 06-22-21 @ 22:05)  Source: .Urine Clean Catch (Midstream)  Final Report (06-23-21 @ 20:08):    No growth    Culture - Blood (collected 06-22-21 @ 21:15)  Source: .Blood Blood-Peripheral  Preliminary Report (06-23-21 @ 22:02):    No growth to date.    Culture - Blood (collected 06-22-21 @ 21:15)  Source: .Blood Blood-Peripheral  Preliminary Report (06-23-21 @ 22:02):    No growth to date.    HIV-1/2 Antigen/Antibody Screen by [facility] (06.23.21 @ 21:49) Nonreact    CMV IgG Antibody: <0.20 U/mL (06-22-21 @ 20:32)    Rapid RVP Result: NotDetec (06-22 @ 14:41)    RADIOLOGY:  Images below reviewed personally  CT Abdomen and Pelvis w/ IV Cont (06.23.21 @ 13:51)  No CT evidence of acute intra-abdominal pathology.    Xray Chest 1 View AP/PA (06.22.21 @ 15:26)   The heart is normal in size. The lungs are clear. No pleural effusion. No pneumothorax. No acute bony pathology could be identified.  
Gordon Smith, PGY-1  Pager: [phone] / [phone]    CHIEF COMPLAINT: Patient is a 51y old  Female who presents with a chief complaint of Fever (2021 06:49)    INTERVAL HPI/OVERNIGHT EVENTS: KORI, pt was seen comfortable sitting and eating this AM. Denies any complaints, CP, SOB, cough, N/V, abd pain. Had 1 soft BM this AM. Feels anxious about her fever.    MEDICATIONS (STANDING):  budesonide  80 MICROgram(s)/formoterol 4.5 MICROgram(s) Inhaler 2 Puff(s) Inhalation two times a day  cefTRIAXone   IVPB 1000 milliGRAM(s) IV Intermittent every 24 hours  famotidine    Tablet 40 milliGRAM(s) Oral at bedtime  lactobacillus acidophilus 1 Tablet(s) Oral daily  lamoTRIgine  milliGRAM(s) Oral daily  metroNIDAZOLE  IVPB 500 milliGRAM(s) IV Intermittent every 8 hours  montelukast 10 milliGRAM(s) Oral daily  pantoprazole    Tablet 40 milliGRAM(s) Oral before breakfast  sodium chloride 0.9%. 1000 milliLiter(s) IV Continuous <Continuous>    MEDICATIONS  (PRN):  acetaminophen   Tablet .. 650 milliGRAM(s) Oral every 6 hours PRN  LORazepam     Tablet 0.25 milliGRAM(s) Oral every 12 hours PRN    REVIEW OF SYSTEMS:  CONSTITUTIONAL: No fever, weight loss, or fatigue  EYES: No eye pain, visual disturbances, or discharge  RESPIRATORY: No cough, wheezing, chills or hemoptysis; No shortness of breath  CARDIOVASCULAR: No chest pain, palpitations, dizziness, or leg swelling  GASTROINTESTINAL: No abdominal or epigastric pain. No nausea, vomiting, or hematemesis; No diarrhea or constipation. No melena or hematochezia.  GENITOURINARY: No dysuria, frequency, hematuria, or incontinence  NEUROLOGICAL: No headaches, memory loss, loss of strength, numbness, or tremors  SKIN: No itching, burning, rashes, or lesions   MUSCULOSKELETAL: No joint pain or swelling; No muscle, back, or extremity pain    VITAL SIGNS:  T(F): 97.6 (21 @ 04:49), Max: 101.5 (21 @ 14:04)  HR: 70 (21 @ 04:49) (62 - 113)  BP: 117/77 (21 @ 04:49) (89/56 - 120/79)  RR: 18 (21 @ 04:49) (15 - 25)  SpO2: 97% (21 @ 04:49) (97% - 100%)    PHYSICAL EXAM:  GENERAL: NAD, well-groomed, well-developed  HEAD:  Atraumatic, Normocephalic  EYES: EOMI, PERRLA, conjunctiva and sclera clear  ENMT: No tonsillar erythema, exudates, or enlargement; Moist mucous membranes  NECK: Supple, No JVD, Normal thyroid  NERVOUS SYSTEM:  Alert & Oriented X3, Good concentration; Motor Strength 5/5 B/L upper and lower extremities  CHEST/LUNG: Clear to auscultation bilaterally; No rales, rhonchi, wheezing, or rubs  HEART: Regular rate and rhythm; No murmurs, rubs, or gallops  ABDOMEN: Soft, Nontender, Nondistended; Bowel sounds present  EXTREMITIES:  2+ Peripheral Pulses, No clubbing, cyanosis, or edema  LYMPH: No lymphadenopathy noted  SKIN: No rashes or lesions    LABS:                        10.5   5.45  )-----------( 130      ( 2021 06:36 )             31.7         141  |  110<H>  |  11  ----------------------------<  100<H>  3.9   |  21<L>  |  0.59    Ca    8.7      2021 06:34  Phos  2.6       Mg     2.1         TPro  6.0  /  Alb  3.5  /  TBili  0.3  /  DBili  x   /  AST  98<H>  /  ALT  262<H>  /  AlkPhos  67      PT/INR - ( 2021 14:48 )   PT: 13.4 sec;   INR: 1.12 ratio    PTT - ( 2021 14:48 )  PTT:29.8 sec    Urinalysis Basic - ( 2021 15:08 )  Color: Yellow / Appearance: Clear / S.023 / pH: x  Gluc: x / Ketone: Negative  / Bili: Negative / Urobili: Negative   Blood: x / Protein: Trace / Nitrite: Negative   Leuk Esterase: Negative / RBC: 10 /hpf / WBC 2 /HPF   Sq Epi: x / Non Sq Epi: 2 /hpf / Bacteria: Negative    RADIOLOGY & ADDITIONAL TESTS:    < from: US Abdomen Upper Quadrant Right (21 @ 17:46) >  INTERPRETATION:  CLINICAL INFORMATION: Transaminitis.    COMPARISON: Abdominal ultrasound 2020.    TECHNIQUE: Sonography of the right upper quadrant.    FINDINGS:    Liver: Within normal limits.  Bile ducts: Normal caliber. Common bile duct measures 2 mm.  Gallbladder: Contracted.  Pancreas: Visualized portions are within normal limits.  Right kidney: 10.8 cm. No hydronephrosis. Redemonstration of a 1.4 cm septated cyst within the right kidney, appears unchanged from 2020.  Ascites: None.  IVC: Visualized portions are within normal limits.    IMPRESSION:    Unremarkable. Septated cyst is again seen in the right kidney.    < end of copied text >  
Gordon Smith, PGY-1  Pager: [phone] / [phone]    CHIEF COMPLAINT: Patient is a 51y old  Female who presents with a chief complaint of Fever (2021 07:07)    INTERVAL HPI/OVERNIGHT EVENTS: KORI, pt was seen and examined at bedside. She feels improved, less weak. No fever since admission day. Denies HA, CP, sob, cough, abd pain, N/V. Had soft BM this AM, no blood/mucous.     MEDICATIONS (STANDING):  budesonide  80 MICROgram(s)/formoterol 4.5 MICROgram(s) Inhaler 2 Puff(s) Inhalation two times a day  famotidine    Tablet 40 milliGRAM(s) Oral at bedtime  lactobacillus acidophilus 1 Tablet(s) Oral daily  lamoTRIgine  milliGRAM(s) Oral daily  montelukast 10 milliGRAM(s) Oral daily  pantoprazole    Tablet 40 milliGRAM(s) Oral before breakfast    MEDICATIONS  (PRN):  acetaminophen   Tablet .. 650 milliGRAM(s) Oral every 6 hours PRN  ibuprofen  Tablet. 400 milliGRAM(s) Oral once PRN  LORazepam     Tablet 0.25 milliGRAM(s) Oral every 12 hours PRN    REVIEW OF SYSTEMS:  CONSTITUTIONAL: No fever, weight loss, or fatigue  RESPIRATORY: No cough, wheezing, chills or hemoptysis; No shortness of breath  CARDIOVASCULAR: No chest pain, palpitations, dizziness, or leg swelling  GASTROINTESTINAL: No abdominal or epigastric pain. No nausea, vomiting, or hematemesis; No diarrhea or constipation. No melena or hematochezia.  GENITOURINARY: No dysuria, frequency, hematuria, or incontinence  NEUROLOGICAL: No headaches, memory loss, loss of strength, numbness, or tremors  SKIN: No itching, burning, rashes, or lesions   MUSCULOSKELETAL: No joint pain or swelling; No muscle, back, or extremity pain    VITAL SIGNS:  T(F): 98.4 (21 @ 05:22), Max: 98.4 (21 @ 21:40)  HR: 82 (21 @ 05:22) (78 - 82)  BP: 119/77 (21 @ 05:22) (119/77 - 119/77)  RR: 18 (21 @ 05:22) (18 - 18)  SpO2: 99% (21 @ 05:22) (97% - 99%)    I&O's Summary  2021 07:01  -  2021 07:00  --------------------------------------------------------  IN: 480 mL / OUT: 0 mL / NET: 480 mL    PHYSICAL EXAM:  GENERAL: NAD, well-groomed, well-developed  HEAD:  Atraumatic, Normocephalic  NERVOUS SYSTEM:  Alert & Oriented X3, Good concentration; Motor Strength 5/5 B/L upper and lower extremities  CHEST/LUNG: Clear to auscultation bilaterally; No rales, rhonchi, wheezing, or rubs  HEART: Regular rate and rhythm; No murmurs, rubs, or gallops  ABDOMEN: Soft, Nontender, Nondistended; Bowel sounds present  EXTREMITIES:  2+ Peripheral Pulses, No clubbing, cyanosis, or edema  LYMPH: No lymphadenopathy noted  SKIN: No rashes or lesions    LABS:                        11.1   6.56  )-----------( 182      ( 2021 06:10 )             33.0     -    144  |  109<H>  |  8   ----------------------------<  97  4.2   |  20<L>  |  0.61    Ca    8.7      2021 06:10  Phos  2.8     -  Mg     2.1     -24    TPro  6.2  /  Alb  3.7  /  TBili  0.2  /  DBili  x   /  AST  84<H>  /  ALT  242<H>  /  AlkPhos  67  06-24    PT/INR - ( 2021 14:48 )   PT: 13.4 sec;   INR: 1.12 ratio         PTT - ( 2021 14:48 )  PTT:29.8 sec  Urinalysis Basic - ( 2021 15:08 )    Color: Yellow / Appearance: Clear / S.023 / pH: x  Gluc: x / Ketone: Negative  / Bili: Negative / Urobili: Negative   Blood: x / Protein: Trace / Nitrite: Negative   Leuk Esterase: Negative / RBC: 10 /hpf / WBC 2 /HPF   Sq Epi: x / Non Sq Epi: 2 /hpf / Bacteria: Negative    HIV-1/2 Antigen/Antibody Screen by [facility] (21 @ 21:49)    HIV-1/2 Combo Result: Nonreact: The HIV Ag/Ab Combo test performed screens for HIV-1 p24 antigen,  antibodies to HIV-1 (group M and group O), and antibodies to HIV-2. All  specimens repeatedly reactive will reflex to an HIV 1/2 antibody  confirmation and differentiation test. This assay detects p24 antigen  which may be present prior to the development of HIV antibodies,  therefore a reactive result with a negative HIV 1/2 AB Confirmation  should be followed up with HIV-1 RNA, HIV-2 RNA and repeat testing in 4-8  weeks. A nonreactive result does not preclude previous exposure to or  infection with HIV-1 or HIV-2.      Babesia microti PCR, Blood. (21 @ 20:04)    Culture Results:   Babesia microti PCR  Results: NOT detected  ***************Result Note*************  The detection of Babesia microti by PCR has only been  validated for whole blood; this test has not been approved  by the US Food and Drug Administration (FDA). Performance  characteristics of this assay have been determined by  [employer]. The clinical significance  of results should be considered in conjunction with the  overall clinical presentation of the patient. Result is not  intended to be used as the sole means for clinical diagnosis  or patient management decisions.  One negative sample does not necessarily rule  out the presence of a parasitic infection.    Culture - Urine (21 @ 22:05)    Specimen Source: .Urine Clean Catch (Midstream)    Culture Results:   No growth    Culture - Blood (21 @ 21:15)    Specimen Source: .Blood Blood-Peripheral    Culture Results:   No growth to date.      Culture - Blood (21 @ 21:15)    Specimen Source: .Blood Blood-Peripheral    Culture Results:   No growth to date.    Cytomegalovirus IgM Antibody, Serum (21 @ 20:32)    CMV IgM Antibody: <8.0 AU/mL    CMV IgM Interpretation: Negative: Method: [employer] Chemiluminescent Immunoassay  Reference ranges: (values expressed as AU/mL)              Negative     < 30.0 AU/mL              Equivocal     30.0 - 34.9 AU/mL              Positive      > 35.0 AU/mL    Cytomegalovirus IgG Antibody, Serum (21 @ 20:32)    CMV IgG Antibody: <0.20 U/mL    CMV IgG Interpretation: Negative: Method: [employer] Chemiluminescent Immunoassay  Reference Range: (values expressed as U/mL)             Negative        < 0.60        U/mL             Equivocal      0.60 - 0.69  U/mL             Positive          >= 0.70      U/mL  The presence of Cytomegalovirus IgG antibody or seroconversion may  indicate recent antigenic stimulation but are not per se confirmatory for  either recent primary infection or reactivation of a pre-existing latent  process with active viral replication.  The titer of asingle specimen  should not be used to aid in the diagnosis of recent infection.  The  assay for the presence of anti-CMV IgM antibody should be used to  diagnose recent infection.    Florentino-Barr Virus Serologic Test (21 @ 20:32)    Florentino-Barr Virus Capsid Antigen IgG: Positive: Florentino-Barr Virus VCA IgG Antibody  Method: [employer] Chemiluminescent Immunoassay  Reference Range: (Expressed in U/mL)       Negative        < 18.0 U/mL       Equivocal      18.0 - 21.9 U/mL       Positive         >= 22.0 U/mL    Florentino-Barr Nuclear Antigen: Negative: Florentino-Barr Virus NA IgG Antibody  Method: [employer] Chemiluminescent Immunoassay  Reference Range: (Expressed in U/mL)       Negative        < 18.0 U/mL       Equivocal      18.0 - 21.9 U/mL       Positive         >= 22.0 U/mL    Florentino Barr Virus IgM Antibody: Negative: Florentino-Barr Virus VCA IgM Antibody  Method: [employer] Chemiluminescent Immunoassay  Reference Range: (Expressed in U/mL)       Negative     < 36.0 U/mL       Equivocal    36.0 - 43.9 U/mL       Positive       >= 44.0 U/mL    Florentino-Barr Virus Early Antigen: Negative: Florentino-Barr Virus EA IgG Antibody  Method: [employer] Chemiluminescent Immunoassay  Reference Range: (Expressed in U/mL)       Negative        < 9.0 U/mL       Equivocal       9.0 - 10.9 U/mL       Positive          >= 11.0 U/mL    EBV Interpretation: See Note: INTERPRETATION OF FLORENTINO BARR VIRUS (EBV) ANTIBODY RESULTS  Sero-Negative   EBV VCA IGG AB - -  NEG   EBV NA IGG AB    - -  NEG   EBV VCA IGM AB - - NEG   EBV EA IGG AB    - -  NEG  Suspected Primary Infection (Early Phase)   EBV VCA IGG AB - -  NEG   EBV NA IGG AB    - -  NEG   EBV VCA IGM AB - - POS   EBV EA IGG AB     - - NEG  Past EBV Infection ( Convalescence)   EBV VCA IGG AB  - - POS   EBV NA IGG AB     - - NEG   EBV VCA IGM AB - - POS   EBV EA IGG AB     - - POS/NEG  Past EBV Infection  EBV VCA IGG AB - - POS   EBV NA IGG AB  - -  POS   EBV VCA IGM AB - -NEG   EBV EA IGG AB   - - POS/NEG  Reactivated Infection   EBV VCA IGG AB - -  POS   EBV NA IGG AB    - -  POS   EBV VCA IGM AB - - POS/NEG   EBV EA IGG AB     - - POS    EBV VCA IgG EIA: >750.0 U/mL    EBNA IgG EIA: 12.4 U/mL    EBV VCA IgM EIA: 26.2 U/mL    EBV EA Ab EIA: 8.3 U/mL        RADIOLOGY & ADDITIONAL TESTS:

## 2021-06-24 NOTE — DISCHARGE NOTE NURSING/CASE MANAGEMENT/SOCIAL WORK - PATIENT PORTAL LINK FT
You can access the FollowMyHealth Patient Portal offered by [facility] by registering at the following website: [email or web address]. By joining [facility]’s FollowMyHealth portal, you will also be able to view your health information using other applications (apps) compatible with our system.
Improved

## 2021-06-25 ENCOUNTER — NON-APPOINTMENT (OUTPATIENT)
Age: 51
End: 2021-06-25

## 2021-06-25 LAB — HETEROPH AB TITR SER AGGL: NEGATIVE — SIGNIFICANT CHANGE UP

## 2021-06-27 LAB
CULTURE RESULTS: SIGNIFICANT CHANGE UP
CULTURE RESULTS: SIGNIFICANT CHANGE UP
SPECIMEN SOURCE: SIGNIFICANT CHANGE UP
SPECIMEN SOURCE: SIGNIFICANT CHANGE UP

## 2021-06-28 ENCOUNTER — TRANSCRIPTION ENCOUNTER (OUTPATIENT)
Age: 51
End: 2021-06-28

## 2021-06-28 LAB
A PHAGOCYTOPH IGG TITR SER IF: NEGATIVE — SIGNIFICANT CHANGE UP
A PHAGOCYTOPH IGM TITR SER IF: NEGATIVE — SIGNIFICANT CHANGE UP
E CHAFFEENSIS IGG TITR SER: NEGATIVE — SIGNIFICANT CHANGE UP
E CHAFFEENSIS IGM TITR SER IF: NEGATIVE — SIGNIFICANT CHANGE UP

## 2021-07-01 ENCOUNTER — APPOINTMENT (OUTPATIENT)
Dept: INTERNAL MEDICINE | Facility: CLINIC | Age: 51
End: 2021-07-01
Payer: COMMERCIAL

## 2021-07-01 VITALS
OXYGEN SATURATION: 99 % | HEIGHT: 62 IN | SYSTOLIC BLOOD PRESSURE: 110 MMHG | DIASTOLIC BLOOD PRESSURE: 80 MMHG | TEMPERATURE: 97.8 F | BODY MASS INDEX: 28.52 KG/M2 | HEART RATE: 101 BPM | WEIGHT: 155 LBS

## 2021-07-01 DIAGNOSIS — D64.9 ANEMIA, UNSPECIFIED: ICD-10-CM

## 2021-07-01 DIAGNOSIS — R50.9 FEVER, UNSPECIFIED: ICD-10-CM

## 2021-07-01 DIAGNOSIS — A77.49 OTHER EHRLICHIOSIS: ICD-10-CM

## 2021-07-01 DIAGNOSIS — R74.8 ABNORMAL LEVELS OF OTHER SERUM ENZYMES: ICD-10-CM

## 2021-07-01 LAB
ALBUMIN SERPL ELPH-MCNC: 4.6 G/DL
ALP BLD-CCNC: 71 U/L
ALT SERPL-CCNC: 107 U/L
ANION GAP SERPL CALC-SCNC: 11 MMOL/L
AST SERPL-CCNC: 23 U/L
BASOPHILS # BLD AUTO: 0.06 K/UL
BASOPHILS NFR BLD AUTO: 0.9 %
BILIRUB SERPL-MCNC: 0.6 MG/DL
BUN SERPL-MCNC: 14 MG/DL
CALCIUM SERPL-MCNC: 9.6 MG/DL
CHLORIDE SERPL-SCNC: 104 MMOL/L
CO2 SERPL-SCNC: 26 MMOL/L
CREAT SERPL-MCNC: 0.83 MG/DL
EOSINOPHIL # BLD AUTO: 0.07 K/UL
EOSINOPHIL NFR BLD AUTO: 1 %
GLUCOSE SERPL-MCNC: 88 MG/DL
HCT VFR BLD CALC: 39.5 %
HGB BLD-MCNC: 12.7 G/DL
IMM GRANULOCYTES NFR BLD AUTO: 0.3 %
LYMPHOCYTES # BLD AUTO: 2.75 K/UL
LYMPHOCYTES NFR BLD AUTO: 40.3 %
MAN DIFF?: NORMAL
MCHC RBC-ENTMCNC: 28.5 PG
MCHC RBC-ENTMCNC: 32.2 GM/DL
MCV RBC AUTO: 88.6 FL
MONOCYTES # BLD AUTO: 0.64 K/UL
MONOCYTES NFR BLD AUTO: 9.4 %
NEUTROPHILS # BLD AUTO: 3.29 K/UL
NEUTROPHILS NFR BLD AUTO: 48.1 %
PLATELET # BLD AUTO: 311 K/UL
POTASSIUM SERPL-SCNC: 4.9 MMOL/L
PROT SERPL-MCNC: 7.3 G/DL
RBC # BLD: 4.46 M/UL
RBC # FLD: 13.4 %
SODIUM SERPL-SCNC: 140 MMOL/L
WBC # FLD AUTO: 6.83 K/UL

## 2021-07-01 PROCEDURE — 36415 COLL VENOUS BLD VENIPUNCTURE: CPT

## 2021-07-01 PROCEDURE — 99072 ADDL SUPL MATRL&STAF TM PHE: CPT

## 2021-07-01 PROCEDURE — 99496 TRANSJ CARE MGMT HIGH F2F 7D: CPT | Mod: 25

## 2021-07-15 PROBLEM — R50.9 FEVER AND CHILLS: Status: ACTIVE | Noted: 2021-06-17

## 2021-07-15 NOTE — HISTORY OF PRESENT ILLNESS
[Post-hospitalization from ___ Hospital] : Post-hospitalization from [unfilled] Hospital [Admitted on: ___] : The patient was admitted on [unfilled] [Discharged on ___] : discharged on [unfilled] [FreeTextEntry2] : Hospital Course \par 51F, PMH of HTN, asthma pw fever for a week. On Monday 6/14 She started having\par chills and fever ~101-102 F that started in the afternoon and responded to tylenol.\par The fever recurred daily since, in the afternoon usually, started and Tmaxed\par today 103. ROS is positive only for generalized "heaviness" feeling and mild\par headache when her fever spikes. She denies visual disturbances photophobia,\par abdominal pain, nausea or vomiting, runny nose, cough, shortness of breath,\par chest pain. Denies diarrhea/constipation, urinary symptoms, joint pain, weight\par loss[place] few\par hours but did not sleep outside. Denies any rash, did not notice any tick bite.\par Has a dog that was with them and is usually outside the house but they check\par him for ticks regularly. Denies other traveling/sick contacts. Was seen by her\par PMD on 6/17 and was tested negative for lyme, COVID and UA was negative. On\par 6/20 was seen in urgent care and was given amoxicillin (took 4 doses). Denies\par any new medications/supplements, no change in diet.\par \par She was hospitalized and blood cultures were obtained.  LFTS and WBC  were high--abdominal US was unremarkable and CXR was wnl.\par \par She improved with IV antibiotics and was discharged home on 6/24/ 21/  Then on 6/25/21--Blood cultures came back + for anaplasmosis.  ID placed her on 14 day course of Doxycycline 100mg bid and she is now 6 days into the rx and overall feeling better\par

## 2021-08-05 NOTE — PATIENT PROFILE ADULT - NSTOBACCONEVERSMOKERY/N_GEN_A
E-scribe request from [facility] for Fluoxetine 20 mg. Patient is on the waitlist for an appt. Health Maintenance   Topic Date Due    COVID-19 Vaccine (1) Never done    Shingles Vaccine (1 of 2) 12/16/2021 (Originally 8/25/2017)    Flu vaccine (1) 09/01/2021    Breast cancer screen  10/22/2021    A1C test (Diabetic or Prediabetic)  07/27/2022    Potassium monitoring  07/27/2022    Creatinine monitoring  07/27/2022    DTaP/Tdap/Td vaccine (2 - Td or Tdap) 10/20/2024    Lipid screen  07/27/2026    Colon cancer screen colonoscopy  02/26/2029    Pneumococcal 0-64 years Vaccine (2 of 2 - PPSV23) 08/25/2032    Hepatitis C screen  Completed    HIV screen  Completed    Hepatitis A vaccine  Aged Out    Hepatitis B vaccine  Aged Out    Hib vaccine  Aged Out    Meningococcal (ACWY) vaccine  Aged Out             (applicable per patient's age: Cancer Screenings, Depression Screening, Fall Risk Screening, Immunizations)    Hemoglobin A1C (%)   Date Value   07/27/2021 6.2 (H)   11/27/2019 6.0   07/25/2019 6.4 (H)     LDL Cholesterol (mg/dL)   Date Value   07/27/2021 96     AST (U/L)   Date Value   07/27/2021 24     ALT (U/L)   Date Value   07/27/2021 16     BUN (mg/dL)   Date Value   07/27/2021 19      (goal A1C is < 7)   (goal LDL is <100) need 30-50% reduction from baseline     BP Readings from Last 3 Encounters:   07/29/21 111/77   06/11/21 111/70   04/30/21 122/69    (goal /80)      All Future Testing planned in [facility]:  Lab Frequency Next Occurrence   Urinalysis Once 01/21/2021   COVID-19 Once 01/28/2021   POCT activated clotting time PRN        Next Visit Date:  No future appointments.          Patient Active Problem List:     HTN (hypertension)     COPD (chronic obstructive pulmonary disease)     Hyperlipidemia     Near syncope     Anxiety     Tobacco abuse     Vesicular rash     Rash of hands     S/P LEEP     Gastroesophageal reflux disease without esophagitis     Trigger finger of right No

## 2022-01-08 ENCOUNTER — NON-APPOINTMENT (OUTPATIENT)
Age: 52
End: 2022-01-08

## 2022-03-16 ENCOUNTER — RESULT REVIEW (OUTPATIENT)
Age: 52
End: 2022-03-16

## 2022-04-11 PROBLEM — Z11.59 SCREENING FOR VIRAL DISEASE: Status: ACTIVE | Noted: 2021-01-06

## 2022-04-28 ENCOUNTER — APPOINTMENT (OUTPATIENT)
Dept: INTERNAL MEDICINE | Facility: CLINIC | Age: 52
End: 2022-04-28
Payer: SELF-PAY

## 2022-04-28 ENCOUNTER — NON-APPOINTMENT (OUTPATIENT)
Age: 52
End: 2022-04-28

## 2022-04-28 VITALS
TEMPERATURE: 98.1 F | SYSTOLIC BLOOD PRESSURE: 128 MMHG | BODY MASS INDEX: 28.52 KG/M2 | HEART RATE: 98 BPM | HEIGHT: 62 IN | OXYGEN SATURATION: 97 % | DIASTOLIC BLOOD PRESSURE: 70 MMHG | WEIGHT: 155 LBS

## 2022-04-28 DIAGNOSIS — G47.33 OBSTRUCTIVE SLEEP APNEA (ADULT) (PEDIATRIC): ICD-10-CM

## 2022-04-28 DIAGNOSIS — R03.0 ELEVATED BLOOD-PRESSURE READING, W/OUT DIAGNOSIS OF HYPERTENSION: ICD-10-CM

## 2022-04-28 DIAGNOSIS — Z41.1 ENCOUNTER FOR COSMETIC SURGERY: ICD-10-CM

## 2022-04-28 PROCEDURE — 93000 ELECTROCARDIOGRAM COMPLETE: CPT

## 2022-04-28 PROCEDURE — 99214 OFFICE O/P EST MOD 30 MIN: CPT | Mod: 25

## 2022-04-28 RX ORDER — MONTELUKAST 10 MG/1
10 TABLET, FILM COATED ORAL
Refills: 0 | Status: ACTIVE | COMMUNITY

## 2022-07-08 ENCOUNTER — RX RENEWAL (OUTPATIENT)
Age: 52
End: 2022-07-08

## 2022-07-13 ENCOUNTER — LABORATORY RESULT (OUTPATIENT)
Age: 52
End: 2022-07-13

## 2022-07-13 ENCOUNTER — APPOINTMENT (OUTPATIENT)
Dept: INTERNAL MEDICINE | Facility: CLINIC | Age: 52
End: 2022-07-13

## 2022-07-13 ENCOUNTER — NON-APPOINTMENT (OUTPATIENT)
Age: 52
End: 2022-07-13

## 2022-07-13 VITALS
DIASTOLIC BLOOD PRESSURE: 80 MMHG | SYSTOLIC BLOOD PRESSURE: 140 MMHG | WEIGHT: 150 LBS | BODY MASS INDEX: 27.6 KG/M2 | HEIGHT: 62 IN | TEMPERATURE: 97.5 F

## 2022-07-13 VITALS
HEIGHT: 62 IN | SYSTOLIC BLOOD PRESSURE: 140 MMHG | DIASTOLIC BLOOD PRESSURE: 80 MMHG | TEMPERATURE: 97.3 F | BODY MASS INDEX: 27.6 KG/M2 | WEIGHT: 150 LBS
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
## 2022-07-13 PROCEDURE — 93000 ELECTROCARDIOGRAM COMPLETE: CPT

## 2022-07-13 PROCEDURE — 36415 COLL VENOUS BLD VENIPUNCTURE: CPT

## 2022-07-13 PROCEDURE — 99396 PREV VISIT EST AGE 40-64: CPT | Mod: 25

## 2022-07-13 RX ORDER — DOXYCYCLINE HYCLATE 100 MG/1
100 TABLET ORAL
Qty: 28 | Refills: 0 | Status: COMPLETED | COMMUNITY
Start: 2021-06-25 | End: 2022-07-13

## 2022-07-13 RX ORDER — ONDANSETRON 8 MG/1
8 TABLET, ORALLY DISINTEGRATING ORAL
Qty: 9 | Refills: 0 | Status: COMPLETED | COMMUNITY
Start: 2022-04-19 | End: 2022-07-13

## 2022-07-13 RX ORDER — METHYLPHENIDATE HYDROCHLORIDE 18 MG/1
18 TABLET, EXTENDED RELEASE ORAL
Qty: 30 | Refills: 0 | Status: COMPLETED | COMMUNITY
Start: 2022-01-19 | End: 2022-07-13

## 2022-07-13 RX ORDER — OXYCODONE AND ACETAMINOPHEN 5; 325 MG/1; MG/1
5-325 TABLET ORAL
Qty: 12 | Refills: 0 | Status: COMPLETED | COMMUNITY
Start: 2022-04-19 | End: 2022-07-13

## 2022-07-13 RX ORDER — LAMOTRIGINE 100 MG/1
100 TABLET ORAL
Qty: 90 | Refills: 0 | Status: ACTIVE | COMMUNITY
Start: 2022-03-16

## 2022-07-13 RX ORDER — PANTOPRAZOLE 40 MG/1
40 TABLET, DELAYED RELEASE ORAL
Qty: 90 | Refills: 0 | Status: COMPLETED | COMMUNITY
Start: 2018-05-01 | End: 2022-07-13

## 2022-07-13 RX ORDER — ATOMOXETINE 40 MG/1
40 CAPSULE ORAL
Qty: 30 | Refills: 0 | Status: COMPLETED | COMMUNITY
Start: 2022-03-16 | End: 2022-07-13

## 2022-07-14 ENCOUNTER — NON-APPOINTMENT (OUTPATIENT)
Age: 52
End: 2022-07-14

## 2022-07-15 ENCOUNTER — LABORATORY RESULT (OUTPATIENT)
Age: 52
End: 2022-07-15
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
## 2022-07-18 LAB
25(OH)D3 SERPL-MCNC: 53.8 NG/ML
A PHAGOCYTOPH IGG TITR SER IF: NORMAL TITER
ALBUMIN SERPL ELPH-MCNC: 4.7 G/DL
ALP BLD-CCNC: 52 U/L
ALT SERPL-CCNC: 17 U/L
ANION GAP SERPL CALC-SCNC: 11 MMOL/L
APPEARANCE: CLEAR
AST SERPL-CCNC: 25 U/L
B BURGDOR AB SER QL IA: NEGATIVE
B MICROTI IGG TITR SER: NORMAL TITER
BASOPHILS # BLD AUTO: 0.04 K/UL
BASOPHILS NFR BLD AUTO: 0.5 %
BILIRUB SERPL-MCNC: 0.5 MG/DL
BILIRUBIN URINE: NEGATIVE
BLOOD URINE: NEGATIVE
BUN SERPL-MCNC: 13 MG/DL
CALCIUM SERPL-MCNC: 9.8 MG/DL
CHLORIDE SERPL-SCNC: 103 MMOL/L
CHOLEST SERPL-MCNC: 220 MG/DL
CO2 SERPL-SCNC: 26 MMOL/L
COLOR: YELLOW
COVID-19 NUCLEOCAPSID  GAM ANTIBODY INTERPRETATION: POSITIVE
CREAT SERPL-MCNC: 0.78 MG/DL
E CHAFFEENSIS IGG TITR SER IF: NORMAL TITER
EGFR: 90 ML/MIN/1.73M2
EOSINOPHIL # BLD AUTO: 0.1 K/UL
EOSINOPHIL NFR BLD AUTO: 1.3 %
ERYTHROCYTE [SEDIMENTATION RATE] IN BLOOD BY WESTERGREN METHOD: 10 MM/HR
ESTIMATED AVERAGE GLUCOSE: 108 MG/DL
GLUCOSE QUALITATIVE U: NEGATIVE
GLUCOSE SERPL-MCNC: 96 MG/DL
HBA1C MFR BLD HPLC: 5.4 %
HCT VFR BLD CALC: 41.3 %
HDLC SERPL-MCNC: 51 MG/DL
HGB BLD-MCNC: 13.8 G/DL
IMM GRANULOCYTES NFR BLD AUTO: 0.3 %
KETONES URINE: NEGATIVE
LDLC SERPL CALC-MCNC: 156 MG/DL
LDLC SERPL DIRECT ASSAY-MCNC: 149 MG/DL
LEUKOCYTE ESTERASE URINE: NEGATIVE
LYMPHOCYTES # BLD AUTO: 2.33 K/UL
LYMPHOCYTES NFR BLD AUTO: 29.2 %
MAN DIFF?: NORMAL
MCHC RBC-ENTMCNC: 29.5 PG
MCHC RBC-ENTMCNC: 33.4 GM/DL
MCV RBC AUTO: 88.2 FL
MONOCYTES # BLD AUTO: 0.63 K/UL
MONOCYTES NFR BLD AUTO: 7.9 %
NEUTROPHILS # BLD AUTO: 4.85 K/UL
NEUTROPHILS NFR BLD AUTO: 60.8 %
NITRITE URINE: NEGATIVE
NONHDLC SERPL-MCNC: 170 MG/DL
PH URINE: 8
PLATELET # BLD AUTO: 209 K/UL
POTASSIUM SERPL-SCNC: 3.8 MMOL/L
PROT SERPL-MCNC: 7.4 G/DL
PROTEIN URINE: NORMAL
RBC # BLD: 4.68 M/UL
RBC # FLD: 12.4 %
SARS-COV-2 AB SERPL QL IA: 3.58 INDEX
SODIUM SERPL-SCNC: 140 MMOL/L
SPECIFIC GRAVITY URINE: 1.03
T3 SERPL-MCNC: 103 NG/DL
T3FREE SERPL-MCNC: 2.9 PG/ML
T3RU NFR SERPL: 1.1 TBI
T4 FREE SERPL-MCNC: 1.2 NG/DL
T4 SERPL-MCNC: 6.5 UG/DL
TRIGL SERPL-MCNC: 69 MG/DL
TSH SERPL-ACNC: 1.2 UIU/ML
UROBILINOGEN URINE: NORMAL
VIT B12 SERPL-MCNC: 1044 PG/ML
WBC # FLD AUTO: 7.97 K/UL

## 2022-07-18 NOTE — HEALTH RISK ASSESSMENT
[Good] : ~his/her~  mood as  good [Intercurrent Urgi Care visits] : went to urgent care [Never (0 pts)] : Never (0 points) [No] : In the past 12 months have you used drugs other than those required for medical reasons? 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 Morales--had pelvic sonogram  [BoneDensityDate] : 01/21 [BoneDensityComments] : T scores were -2.4  and -1.8 [ColonoscopyDate] : 04/18 [ColonoscopyComments] : with Dr. Quintanilla/ also had EGD

## 2022-07-18 NOTE — HISTORY OF PRESENT ILLNESS
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.  She enjoys her job.\par .\par \par BP readings have been stable since her last visit.  No chest pains or headaches.\par \par She sees a psychiatrist Dr. Kelvin Winkler in [facility] 4months for anxiety--that got worse in 4/2020 after the start of the COVID 19 pandemic.  She did not respond well to SSRIs in the past. She was started on Lamictal 200mg which was increased  to 250mg qd.  She is doing much better on this and rarely takes the Ativan prn.\par \par She is planning a trip to [place] and [facility] in early August this year. She wants to take prednisone and rx for Zpak in case she gets ill with COVID  asthmatic bronchitis.

## 2022-08-16 NOTE — ED CLERICAL - NS ED CLERK UNITS
[facility]. [facility] Unable to complete full assessment. Discussed referral for [facility]. Patient agreed to referral and follow up on Thursday to complete the assessment.     
APER

## 2022-10-12 ENCOUNTER — TRANSCRIPTION ENCOUNTER (OUTPATIENT)
Age: 52
End: 2022-10-12

## 2022-10-27 ENCOUNTER — APPOINTMENT (OUTPATIENT)
Dept: GASTROENTEROLOGY | Facility: CLINIC | Age: 52
End: 2022-10-27

## 2022-12-08 ENCOUNTER — TRANSCRIPTION ENCOUNTER (OUTPATIENT)
Age: 52
End: 2022-12-08

## 2024-02-13 ENCOUNTER — APPOINTMENT (OUTPATIENT)
Dept: GASTROENTEROLOGY | Facility: CLINIC | Age: 54
End: 2024-02-13

## 2024-02-15 ENCOUNTER — TRANSCRIPTION ENCOUNTER (OUTPATIENT)
Age: 54
End: 2024-02-15

## 2024-02-27 ENCOUNTER — RX ONLY (RX ONLY)
Age: 54
End: 2024-02-27

## 2024-02-27 ENCOUNTER — OFFICE (OUTPATIENT)
Dept: URBAN - METROPOLITAN AREA CLINIC 109 | Facility: CLINIC | Age: 54
Setting detail: OPHTHALMOLOGY
End: 2024-02-27
Payer: COMMERCIAL

## 2024-02-27 DIAGNOSIS — H11.153: ICD-10-CM

## 2024-02-27 PROCEDURE — 92004 COMPRE OPH EXAM NEW PT 1/>: CPT | Performed by: OPHTHALMOLOGY

## 2024-02-27 ASSESSMENT — REFRACTION_AUTOREFRACTION
OD_CYLINDER: -1.75
OS_SPHERE: +0.25
OD_AXIS: 091
OS_CYLINDER: -1.00
OD_SPHERE: -2.25
OS_AXIS: 094

## 2024-02-27 ASSESSMENT — REFRACTION_CURRENTRX
OD_OVR_VA: 20/
OS_OVR_VA: 20/
OS_AXIS: 077
OD_SPHERE: -1.75
OS_SPHERE: -0.50
OD_CYLINDER: -0.50
OS_VPRISM_DIRECTION: SV
OD_VPRISM_DIRECTION: SV
OS_CYLINDER: -0.75
OD_AXIS: 081

## 2024-02-27 ASSESSMENT — SPHEQUIV_DERIVED
OD_SPHEQUIV: -3.125
OS_SPHEQUIV: -0.25

## 2024-02-27 ASSESSMENT — CONFRONTATIONAL VISUAL FIELD TEST (CVF)
OS_FINDINGS: FULL
OD_FINDINGS: FULL

## 2024-03-07 NOTE — ED ADULT NURSE NOTE - PRO INTERPRETER NEED 2
GOAL: Patient will perform sit to stand transfers independently at rolling walker with proper hand placement in 2 weeks English

## 2024-04-25 NOTE — ED PROVIDER NOTE - OBJECTIVE STATEMENT
[age] female sent to ER to r/o pancreatitis, patient c/o epigatric pain, radating to sides of abdomen, nausea, no vomiting, started today.

## 2024-05-22 ENCOUNTER — APPOINTMENT (OUTPATIENT)
Dept: INTERNAL MEDICINE | Facility: CLINIC | Age: 54
End: 2024-05-22

## 2024-05-22 ENCOUNTER — LABORATORY RESULT (OUTPATIENT)
Age: 54
End: 2024-05-22

## 2024-05-22 VITALS
HEART RATE: 73 BPM | TEMPERATURE: 97.9 F | OXYGEN SATURATION: 99 % | HEIGHT: 62 IN | DIASTOLIC BLOOD PRESSURE: 59 MMHG | SYSTOLIC BLOOD PRESSURE: 91 MMHG | BODY MASS INDEX: 29.26 KG/M2 | WEIGHT: 159 LBS

## 2024-05-22 DIAGNOSIS — Z13.820 ENCOUNTER FOR SCREENING FOR OSTEOPOROSIS: ICD-10-CM

## 2024-05-22 DIAGNOSIS — I10 ESSENTIAL (PRIMARY) HYPERTENSION: ICD-10-CM

## 2024-05-22 DIAGNOSIS — Z00.00 ENCOUNTER FOR GENERAL ADULT MEDICAL EXAMINATION W/OUT ABNORMAL FINDINGS: ICD-10-CM

## 2024-05-22 DIAGNOSIS — J45.909 UNSPECIFIED ASTHMA, UNCOMPLICATED: ICD-10-CM

## 2024-05-22 DIAGNOSIS — M25.50 PAIN IN UNSPECIFIED JOINT: ICD-10-CM

## 2024-05-22 DIAGNOSIS — M62.81 MUSCLE WEAKNESS (GENERALIZED): ICD-10-CM

## 2024-05-22 PROCEDURE — 99396 PREV VISIT EST AGE 40-64: CPT

## 2024-05-22 PROCEDURE — 36415 COLL VENOUS BLD VENIPUNCTURE: CPT

## 2024-05-22 RX ORDER — PREDNISONE 20 MG/1
20 TABLET ORAL
Qty: 20 | Refills: 0 | Status: COMPLETED | COMMUNITY
Start: 2022-07-13 | End: 2024-05-22

## 2024-05-22 RX ORDER — LOSARTAN POTASSIUM AND HYDROCHLOROTHIAZIDE 12.5; 5 MG/1; MG/1
50-12.5 TABLET ORAL
Qty: 90 | Refills: 3 | Status: COMPLETED | COMMUNITY
Start: 2022-01-09 | End: 2024-05-22

## 2024-05-22 RX ORDER — FLUTICASONE FUROATE AND VILANTEROL TRIFENATATE 50; 25 UG/1; UG/1
POWDER RESPIRATORY (INHALATION)
Refills: 0 | Status: COMPLETED | COMMUNITY
End: 2024-05-22

## 2024-05-22 RX ORDER — ALBUTEROL SULFATE 2.5 MG/3ML
(2.5 MG/3ML) SOLUTION RESPIRATORY (INHALATION)
Qty: 1 | Refills: 3 | Status: COMPLETED | COMMUNITY
Start: 2020-12-28 | End: 2024-05-22

## 2024-05-22 RX ORDER — AZITHROMYCIN 250 MG/1
250 TABLET, FILM COATED ORAL
Qty: 1 | Refills: 0 | Status: COMPLETED | COMMUNITY
Start: 2022-07-13 | End: 2024-05-22

## 2024-05-22 RX ORDER — FAMOTIDINE 40 MG/1
40 TABLET, FILM COATED ORAL
Qty: 30 | Refills: 0 | Status: COMPLETED | COMMUNITY
Start: 2020-01-08 | End: 2024-05-22

## 2024-05-22 RX ORDER — OLMESARTAN MEDOXOMIL AND HYDROCHLOROTHIAZIDE 40; 12.5 MG/1; MG/1
40-12.5 TABLET ORAL
Refills: 0 | Status: ACTIVE | COMMUNITY

## 2024-05-22 NOTE — HEALTH RISK ASSESSMENT
[Good] : ~his/her~  mood as  good [Intercurrent Urgi Care visits] : went to urgent care [Never (0 pts)] : Never (0 points) [No] : In the past 12 months have you used drugs other than those required for medical reasons? No [0] : 2) Feeling down, depressed, or hopeless: Not at all (0) [Patient reported mammogram was normal] : Patient reported mammogram was normal [Patient reported PAP Smear was normal] : Patient reported PAP Smear was normal [Patient reported bone density results were abnormal] : Patient reported bone density results were abnormal [Patient reported colonoscopy was normal] : Patient reported colonoscopy was normal [HIV test declined] : HIV test declined [Hepatitis C test declined] : Hepatitis C test declined [de-identified] : seeing psychiatrist Dr. Kelvin Winkler q 4months  and sees Dr. Fleming for hypertension, pulmonologist Dr. Craft for asthma [de-identified] : stopped due to headaches [Audit-CScore] : 0 [de-identified] : works out 6 days a week -does videos--cardio and weights [de-identified] : mostly vegetarian diet-has some fish and dairy [[id number]] : 0 [MammogramDate] : 04/23 [MammogramComments] : in [place] [facility] [PapSmearDate] : 03/23 [PapSmearComments] : with Dr. Morales--had pelvic sonogram  [BoneDensityDate] : 01/21 [BoneDensityComments] : T scores were -2.4  and -1.8 [ColonoscopyDate] : 04/18 [ColonoscopyComments] : with Dr. Quintanilla/ also had EGD

## 2024-05-22 NOTE — HISTORY OF PRESENT ILLNESS
[FreeTextEntry1] : Patient presents today for CPE.\par   [de-identified] : Patient has been in good overall health this past year. 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  She enjoys her job.   CT Heart Score was less than 10 Echo- was wnl--changed BP meds to olmesartan (from Losartan) .  BP readings have been stable since her last visit.  No chest pains or headaches.  She sees a psychiatrist Dr. Kelvin Winkler in [facility] 4months for anxiety--that got worse in 4/2020 after the start of the COVID 19 pandemic.  She did not respond well to SSRIs in the past. She was started on Lamictal 200mg which was increased  to 250mg qd.  She is doing much better on this and rarely takes the Ativan prn.  She is planning a trip to [place] and [facility] in early August this year. She wants to take prednisone and rx for Zpak in case she gets ill with COVID  asthmatic bronchitis.

## 2024-05-29 LAB
ACRM BINDING ANTIBODY: <0.03 NMOL/L
ALDOLASE SERPL-CCNC: 5.4 U/L
ANA SER IF-ACNC: NEGATIVE
CK SERPL-CCNC: 87 U/L
CRP SERPL-MCNC: 6 MG/L
ERYTHROCYTE [SEDIMENTATION RATE] IN BLOOD BY WESTERGREN METHOD: 6 MM/HR

## 2024-06-05 LAB — MUSK ANTIBODY TEST: NORMAL

## 2024-09-16 ENCOUNTER — TRANSCRIPTION ENCOUNTER (OUTPATIENT)
Age: 54
End: 2024-09-16

## 2025-04-28 ENCOUNTER — TRANSCRIPTION ENCOUNTER (OUTPATIENT)
Age: 55
End: 2025-04-28

## 2025-05-05 ENCOUNTER — TRANSCRIPTION ENCOUNTER (OUTPATIENT)
Age: 55
End: 2025-05-05

## 2025-05-05 DIAGNOSIS — G47.00 INSOMNIA, UNSPECIFIED: ICD-10-CM

## 2025-05-05 RX ORDER — LORAZEPAM 1 MG/1
1 TABLET ORAL
Qty: 30 | Refills: 0 | Status: ACTIVE | COMMUNITY
Start: 2025-05-05 | End: 1900-01-01